# Patient Record
Sex: FEMALE | Race: WHITE | URBAN - METROPOLITAN AREA
[De-identification: names, ages, dates, MRNs, and addresses within clinical notes are randomized per-mention and may not be internally consistent; named-entity substitution may affect disease eponyms.]

---

## 2017-10-24 ENCOUNTER — OFFICE VISIT (OUTPATIENT)
Dept: BEHAVIORAL/MENTAL HEALTH CLINIC | Age: 22
End: 2017-10-24

## 2017-10-24 VITALS — SYSTOLIC BLOOD PRESSURE: 114 MMHG | WEIGHT: 112 LBS | HEART RATE: 85 BPM | DIASTOLIC BLOOD PRESSURE: 79 MMHG

## 2017-10-24 DIAGNOSIS — F33.1 MODERATE EPISODE OF RECURRENT MAJOR DEPRESSIVE DISORDER (HCC): Primary | ICD-10-CM

## 2017-10-24 DIAGNOSIS — Z86.59 HISTORY OF ADHD: ICD-10-CM

## 2017-10-24 RX ORDER — ALBUTEROL SULFATE 90 UG/1
AEROSOL, METERED RESPIRATORY (INHALATION)
Refills: 0 | COMMUNITY
Start: 2017-08-31

## 2017-10-24 RX ORDER — FLUOXETINE 10 MG/1
10 CAPSULE ORAL DAILY
Qty: 30 CAP | Refills: 0 | Status: SHIPPED | OUTPATIENT
Start: 2017-10-24 | End: 2017-11-21 | Stop reason: DRUGHIGH

## 2017-10-24 RX ORDER — NORETHINDRONE ACETATE AND ETHINYL ESTRADIOL AND FERROUS FUMARATE 1.5-30(21)
KIT ORAL
Refills: 8 | COMMUNITY
Start: 2017-09-30 | End: 2022-03-23

## 2017-10-24 RX ORDER — DEXAMETHASONE 4 MG/1
TABLET ORAL
Refills: 2 | COMMUNITY
Start: 2017-09-15 | End: 2022-03-23

## 2017-10-24 RX ORDER — FLUOXETINE 10 MG/1
10 CAPSULE ORAL DAILY
Qty: 30 CAP | Refills: 0 | Status: SHIPPED | OUTPATIENT
Start: 2017-10-24 | End: 2017-10-24 | Stop reason: SDUPTHER

## 2017-10-24 RX ORDER — OMEPRAZOLE 20 MG/1
CAPSULE, DELAYED RELEASE ORAL
Refills: 1 | COMMUNITY
Start: 2017-09-23 | End: 2022-03-23

## 2017-10-24 NOTE — MR AVS SNAPSHOT
Visit Information Date & Time Provider Department Dept. Phone Encounter #  
 10/24/2017  2:00 PM Anna Muller MD [facility] [id number] Follow-up Instructions Return in about 1 month (around 11/24/2017). Allergies as of 10/24/2017  Review Complete On: 10/24/2017 By: Anna Muller MD  
  
 Severity Noted Reaction Type Reactions Beef Containing Products  10/24/2017    Itching Milk  10/24/2017    Other (comments) Pork Derived (Porcine)  10/24/2017    Itching Current Immunizations  Never Reviewed No immunizations on file. Not reviewed this visit You Were Diagnosed With   
  
 Codes Comments Moderate episode of recurrent [facility] depressive disorder ([facility]    -  Primary ICD-10-CM: F33.1 ICD-9-CM: 296.32 History of ADHD     ICD-10-CM: Z86.59 
ICD-9-CM: V11.8 Vitals BP Pulse Weight(growth percentile) LMP Smoking Status 114/79 85 112 lb (50.8 kg) 09/24/2017 Current Some Day Smoker Vitals History Preferred Pharmacy Pharmacy Name Phone [facility] [address] [place] [address] [phone] Your Updated Medication List  
  
   
This list is accurate as of: 10/24/17  2:32 PM.  Always use your most recent med list.  
  
  
  
  
 FLOVENT  mcg/actuation inhaler Generic drug:  fluticasone INHALE 2 INHALATIONS PO BID FLUoxetine 10 mg capsule Commonly known as:  PROzac Take 1 Cap by mouth daily. Indications: major depressive disorder MICROGESTIN FE 1.5/30 (28) 1.5 mg-30 mcg (21)/75 mg (7) Tab Generic drug:  norethindrone-ethinyl estradiol-iron TK 1 T PO D  
  
 omeprazole 20 mg capsule Commonly known as:  PRILOSEC TK 1 C PO QAM BEFORE BREAKFAST PROAIR HFA 90 mcg/actuation inhaler Generic drug:  albuterol INHALE 2 PUFFS PO Q 4 TO 6 H PRN BREATHING  
  
  
  
  
Prescriptions Sent to Pharmacy Refills FLUoxetine (PROZAC) 10 mg capsule 0 Sig: Take 1 Cap by mouth daily. Indications: major depressive disorder Class: Normal  
 Pharmacy: [facility], [address] [place] [address] [address] #: [phone] Route: Oral  
  
Follow-up Instructions Return in about 1 month (around 11/24/2017). Introducing [facility] SERVICES! [facility] introduces Labcyte patient portal. Now you can access parts of your medical record, email your doctor's office, and request medication refills online. 1. In your internet browser, go to [email or web address] 2. Click on the First Time User? Click Here link in the Sign In box. You will see the New Member Sign Up page. 3. Enter your Labcyte Access Code exactly as it appears below. You will not need to use this code after youve completed the sign-up process. If you do not sign up before the expiration date, you must request a new code. · Labcyte Access Code: [email or web address] Expires: 1/22/2018  2:21 PM 
 
4. Enter the last four digits of your Social Security Number (xxxx) and Date of Birth (mm/dd/yyyy) as indicated and click Submit. You will be taken to the next sign-up page. 5. Create a Labcyte ID. This will be your Labcyte login ID and cannot be changed, so think of one that is secure and easy to remember. 6. Create a Labcyte password. You can change your password at any time. 7. Enter your Password Reset Question and Answer. This can be used at a later time if you forget your password. 8. Enter your e-mail address. You will receive e-mail notification when new information is available in [address] 9. Click Sign Up. You can now view and download portions of your medical record. 10. Click the Download Summary menu link to download a portable copy of your medical information.  
 
If you have questions, please visit the Frequently Asked Questions section of the ProtonMail. Remember, Classtingt is NOT to be used for urgent needs. For medical emergencies, dial 911. Now available from your iPhone and Android! Please provide this summary of care documentation to your next provider. If you have any questions after today's visit, please call [phone].
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
## 2017-11-21 ENCOUNTER — OFFICE VISIT (OUTPATIENT)
Dept: BEHAVIORAL/MENTAL HEALTH CLINIC | Age: 22
End: 2017-11-21

## 2017-11-21 VITALS
HEIGHT: 59 IN | WEIGHT: 108 LBS | SYSTOLIC BLOOD PRESSURE: 122 MMHG | BODY MASS INDEX: 21.77 KG/M2 | HEART RATE: 98 BPM | DIASTOLIC BLOOD PRESSURE: 84 MMHG

## 2017-11-21 DIAGNOSIS — F33.1 MODERATE EPISODE OF RECURRENT MAJOR DEPRESSIVE DISORDER (HCC): Primary | ICD-10-CM

## 2017-11-21 DIAGNOSIS — Z86.59 HISTORY OF ADHD: ICD-10-CM

## 2017-11-21 RX ORDER — FLUOXETINE HYDROCHLORIDE 20 MG/1
20 CAPSULE ORAL DAILY
Qty: 30 CAP | Refills: 0 | Status: SHIPPED | OUTPATIENT
Start: 2017-11-21 | End: 2017-12-20 | Stop reason: SDUPTHER

## 2017-11-21 NOTE — MR AVS SNAPSHOT
Visit Information Date & Time Provider Department Dept. Phone Encounter #  
 11/21/2017  1:30 PM Zaria Agosto MD [facility] [id number] Follow-up Instructions Return in about 1 month (around 12/21/2017). Upcoming Health Maintenance Date Due  
 HPV AGE 9Y-34Y (1 of 3 - Female 3 Dose Series) 8/16/2007 Pneumococcal 19-64 Medium Risk (1 of 1 - PPSV23) 8/16/2015 Influenza Age 5 to Adult 8/1/2017 DTaP/Tdap/Td series (1 - Tdap) 8/16/2017 PAP AKA CERVICAL CYTOLOGY 8/16/2017 Allergies as of 11/21/2017  Review Complete On: 11/21/2017 By: Zaria Agosto MD  
  
 Severity Noted Reaction Type Reactions Beef Containing Products  10/24/2017    Itching Milk  10/24/2017    Other (comments) Pork Derived (Porcine)  10/24/2017    Itching Current Immunizations  Never Reviewed No immunizations on file. Not reviewed this visit You Were Diagnosed With   
  
 Codes Comments Moderate episode of recurrent major depressive disorder ([facility]    -  Primary ICD-10-CM: F33.1 ICD-9-CM: 296.32 History of ADHD     ICD-10-CM: Z86.59 
ICD-9-CM: V11.8 Vitals BP Pulse Height(growth percentile) Weight(growth percentile) LMP BMI  
 122/84 98 4' 11\" (1.499 m) 108 lb (49 kg) 10/24/2017 21.81 kg/m2 Smoking Status Current Some Day Smoker BMI and BSA Data Body Mass Index Body Surface Area  
 21.81 kg/m 2 1.43 m 2 Preferred Pharmacy Pharmacy Name Phone [facility] [address] [facility] [phone] Your Updated Medication List  
  
   
This list is accurate as of: 11/21/17  1:49 PM.  Always use your most recent med list.  
  
  
  
  
 FLOVENT  mcg/actuation inhaler Generic drug:  fluticasone INHALE 2 INHALATIONS PO BID FLUoxetine 20 mg capsule Commonly known as:  PROzac  
 Take 1 Cap by mouth daily. Indications: major depressive disorder MICROGESTIN FE 1.5/30 (28) 1.5 mg-30 mcg (21)/75 mg (7) Tab Generic drug:  norethindrone-ethinyl estradiol-iron TK 1 T PO D  
  
 omeprazole 20 mg capsule Commonly known as:  PRILOSEC TK 1 C PO QAM BEFORE BREAKFAST PROAIR HFA 90 mcg/actuation inhaler Generic drug:  albuterol INHALE 2 PUFFS PO Q 4 TO 6 H PRN BREATHING  
  
  
  
  
Prescriptions Sent to Pharmacy Refills FLUoxetine (PROZAC) 20 mg capsule 0 Sig: Take 1 Cap by mouth daily. Indications: major depressive disorder Class: Normal  
 Pharmacy: [facility] [place] [address] [place] AT [address] #: [phone] Route: Oral  
  
Follow-up Instructions Return in about 1 month (around 12/21/2017). Introducing [facility] & HEALTH SERVICES! Adelina Joyce introduces [employer] patient portal. Now you can access parts of your medical record, email your doctor's office, and request medication refills online. 1. In your internet browser, go to [email or web address] 2. Click on the First Time User? Click Here link in the Sign In box. You will see the New Member Sign Up page. 3. Enter your [employer] Access Code exactly as it appears below. You will not need to use this code after youve completed the sign-up process. If you do not sign up before the expiration date, you must request a new code. · [employer] Access Code: [email or web address] Expires: 1/22/2018  1:21 PM 
 
4. Enter the last four digits of your Social Security Number (xxxx) and Date of Birth (mm/dd/yyyy) as indicated and click Submit. You will be taken to the next sign-up page. 5. Create a imeemt ID. This will be your Pact Fitness login ID and cannot be changed, so think of one that is secure and easy to remember. 6. Create a imeemt password. You can change your password at any time. 7. Enter your Password Reset Question and Answer. This can be used at a later time if you forget your password. 8. Enter your e-mail address. You will receive e-mail notification when new information is available in [address] 9. Click Sign Up. You can now view and download portions of your medical record. 10. Click the Download Summary menu link to download a portable copy of your medical information. If you have questions, please visit the Frequently Asked Questions section of the Cashsquare website. Remember, Cashsquare is NOT to be used for urgent needs. For medical emergencies, dial 911. Now available from your iPhone and Android! Please provide this summary of care documentation to your next provider. If you have any questions after today's visit, please call [phone].

## 2017-11-24 NOTE — PROGRESS NOTES
Psychiatric Progress Note    Date: 11/21/2017  Account Number:  [de-identified]  Name: Aldair Marinelli    Length of psychotherapy session: 15 minutes     Total Patient Care Time Spent: 20 minutes : (Coordinated care:  counseling time with patient, individual psychotherapy with patient; discussions with family members and chart review). SUBJECTIVE:   Aldair Marinelli  is a 25 y.o.  female  patient presents for a therapy/psychopharmacological management appointment. Pt reports doing \"okay, I guess\". States she has noticed some difference in herself, now getting out of bed more, goes to work but when returns home goes back to bed, still feels hopeless and helpless. Patient denies SI/HI/SIB. No evidence of AH/VH or delusions.       Appetite:no change from normal   Sleep: no change     Response to Treatment: minimal improvement  Side Effects: none      Supportive/Cognitive/Reality-Oriented psychotherapy provided in regards to psychosocial stressors:   pre-admission and current problems   Housing issues   Occupational issues   Academic issues   Legal issues   Medical issues   Interpersonal conflicts   Stress of hospitalization  Psychoeducation provided  Treatment plan reviewed with patient-including diagnosis and medications  Worked on issues of denial & effects of substance dependency/use      OBJECTIVE:                 Mental Status exam: WNL except for      Sensorium  oriented to time, place and person   Relations cooperative    Eye Contact    appropriate   Appearance:  age appropriate, casually dressed and groomed, petite    Motor Behavior:  within normal limits   Speech:  normal pitch and normal volume   Thought Process: organized   Thought Content free of delusions and free of hallucinations   Suicidal ideations none   Homicidal ideations none   Mood:  depressed   Affect:  constricted   Memory recent  adequate   Memory remote:  adequate   Concentration:  adequate   Abstraction:  abstract   Insight:  fair Reliability fair   Judgment:  fair       Allergies   Allergen Reactions    Beef Containing Products Itching    Milk Other (comments)    Pork Derived (Porcine) Itching        Current Outpatient Prescriptions   Medication Sig Dispense Refill    FLUoxetine (PROZAC) 20 mg capsule Take 1 Cap by mouth daily. Indications: major depressive disorder 30 Cap 0    MICROGESTIN FE 1.5/30, 28, 1.5 mg-30 mcg (21)/75 mg (7) tab TK 1 T PO D  8    omeprazole (PRILOSEC) 20 mg capsule TK 1 C PO QAM BEFORE BREAKFAST  1    PROAIR HFA 90 mcg/actuation inhaler INHALE 2 PUFFS PO Q 4 TO 6 H PRN BREATHING  0    FLOVENT  mcg/actuation inhaler INHALE 2 INHALATIONS PO BID  2        Medication Changes/Adjustments:   Medications Discontinued During This Encounter   Medication Reason    FLUoxetine (PROZAC) 10 mg capsule Dose Adjustment          ASSESSMENT:  Apolinar Schmidt  is a 25 y.o.  female patient presented for her f/u appointment. Able to tolerate the medicine, reports minimal improvement, will continue to titrate up the dose. Diagnoses:   Axis I:   Major Depressive Disorder              ADHD by history   Axis II:  Deferred  Axis III: As noted above  Axis IV: Moderate   Axis V:  59-65    RECOMMENDATIONS/PLAN:   1. Medications: Medications reviewed, will increase Prozac to 20mg daily. Orders Placed This Encounter    FLUoxetine (PROZAC) 20 mg capsule      2. Follow-up Disposition:  Return in about 1 month (around 12/21/2017).

## 2017-12-20 ENCOUNTER — OFFICE VISIT (OUTPATIENT)
Dept: BEHAVIORAL/MENTAL HEALTH CLINIC | Age: 22
End: 2017-12-20

## 2017-12-20 DIAGNOSIS — F33.1 MODERATE EPISODE OF RECURRENT MAJOR DEPRESSIVE DISORDER (HCC): Primary | ICD-10-CM

## 2017-12-20 DIAGNOSIS — Z86.59 HISTORY OF ADHD: ICD-10-CM

## 2017-12-20 RX ORDER — FLUOXETINE HYDROCHLORIDE 20 MG/1
20 CAPSULE ORAL DAILY
Qty: 30 CAP | Refills: 2 | Status: SHIPPED | OUTPATIENT
Start: 2017-12-20 | End: 2018-02-19 | Stop reason: SDUPTHER

## 2017-12-20 NOTE — MR AVS SNAPSHOT
Visit Information Date & Time Provider Department Dept. Phone Encounter #  
 12/20/2017  1:30 PM Judy Coffman  [facility] [phone] [id number] Follow-up Instructions Return in about 2 months (around 2/20/2018). Upcoming Health Maintenance Date Due  
 HPV AGE 9Y-34Y (1 of 3 - Female 3 Dose Series) 8/16/2007 Pneumococcal 19-64 Medium Risk (1 of 1 - PPSV23) 8/16/2015 Influenza Age 5 to Adult 8/1/2017 DTaP/Tdap/Td series (1 - Tdap) 8/16/2017 PAP AKA CERVICAL CYTOLOGY 8/16/2017 Allergies as of 12/20/2017  Review Complete On: 12/20/2017 By: Judy Coffman MD  
  
 Severity Noted Reaction Type Reactions Beef Containing Products  10/24/2017    Itching Milk  10/24/2017    Other (comments) Pork Derived (Porcine)  10/24/2017    Itching Current Immunizations  Never Reviewed No immunizations on file. Not reviewed this visit You Were Diagnosed With   
  
 Codes Comments Moderate episode of recurrent major depressive disorder [facility]    -  Primary ICD-10-CM: F33.1 ICD-9-CM: 296.32 History of ADHD     ICD-10-CM: Z86.59 
ICD-9-CM: V11.8 Vitals LMP Smoking Status 10/24/2017 Current Some Day Smoker Preferred Pharmacy [facility]acy Name Phone [facility] [address] [facility] [phone] Your Updated Medication List  
  
   
This list is accurate as of: 12/20/17  1:49 PM.  Always use your most recent med list.  
  
  
  
  
 FLOVENT  mcg/actuation inhaler Generic drug:  fluticasone INHALE 2 INHALATIONS PO BID FLUoxetine 20 mg capsule Commonly known as:  PROzac Take 1 Cap by mouth daily. Indications: major depressive disorder MICROGESTIN FE 1.5/30 (28) 1.5 mg-30 mcg (21)/75 mg (7) Tab Generic drug:  norethindrone-ethinyl estradiol-iron TK 1 T PO D  
  
 omeprazole 20 mg capsule Commonly known as:  PRILOSEC TK 1 C PO QAM BEFORE BREAKFAST PROAIR HFA 90 mcg/actuation inhaler Generic drug:  albuterol INHALE 2 PUFFS PO Q 4 TO 6 H PRN BREATHING  
  
  
  
  
Prescriptions Sent to Pharmacy Refills FLUoxetine (PROZAC) 20 mg capsule 2 Sig: Take 1 Cap by mouth daily. Indications: major depressive disorder Class: Normal  
 Pharmacy: [facility], [address] [place] [address] #: [phone] Route: Oral  
  
Follow-up Instructions Return in about 2 months (around 2/20/2018). Introducing [facility] & HEALTH SERVICES! Milton Norris introduces Proformative patient portal. Now you can access parts of your medical record, email your doctor's office, and request medication refills online. 1. In your internet browser, go to [email or web address] 2. Click on the First Time User? Click Here link in the Sign In box. You will see the New Member Sign Up page. 3. Enter your Proformative Access Code exactly as it appears below. You will not need to use this code after youve completed the sign-up process. If you do not sign up before the expiration date, you must request a new code. · Proformative Access Code: [email or web address] Expires: 1/22/2018  1:21 PM 
 
4. Enter the last four digits of your Social Security Number (xxxx) and Date of Birth (mm/dd/yyyy) as indicated and click Submit. You will be taken to the next sign-up page. 5. Create a Instabankt ID. This will be your Proformative login ID and cannot be changed, so think of one that is secure and easy to remember. 6. Create a Proformative password. You can change your password at any time. 7. Enter your Password Reset Question and Answer. This can be used at a later time if you forget your password. 8. Enter your e-mail address. You will receive e-mail notification when new information is available in [address] 9. Click Sign Up. You can now view and download portions of your medical record. 10. Click the Download Summary menu link to download a portable copy of your medical information. If you have questions, please visit the Frequently Asked Questions section of the Ultralife website. Remember, Ultralife is NOT to be used for urgent needs. For medical emergencies, dial 911. Now available from your iPhone and Android! Please provide this summary of care documentation to your next provider. If you have any questions after today's visit, please call [phone].

## 2017-12-23 NOTE — PROGRESS NOTES
Psychiatric Progress Note    Date: 12/20/2017  Account Number:  [de-identified]  Name: Felton Richardson    Length of psychotherapy session: 15 minutes     Total Patient Care Time Spent: 20 minutes : (Coordinated care:  counseling time with patient, individual psychotherapy with patient; discussions with family members and chart review). SUBJECTIVE:   Felton Richardson  is a 25 y.o.  female  patient presents for a therapy/psychopharmacological management appointment. Pt reports doing good, states she is feeling lot better now, mood is improved so is anxiety. Taking meds as prescribe. Patient denies SI/HI/SIB. No evidence of AH/VH or delusions.       Appetite:no change from normal   Sleep: no change     Response to Treatment: positive   Side Effects: none      Supportive/Cognitive/Reality-Oriented psychotherapy provided in regards to psychosocial stressors:   pre-admission and current problems   Housing issues   Occupational issues   Academic issues   Legal issues   Medical issues   Interpersonal conflicts   Stress of hospitalization  Psychoeducation provided  Treatment plan reviewed with patient-including diagnosis and medications  Worked on issues of denial & effects of substance dependency/use      OBJECTIVE:                 Mental Status exam: WNL except for      Sensorium  oriented to time, place and person   Relations cooperative    Eye Contact    appropriate   Appearance:  age appropriate, casually dressed and groomed, petite    Motor Behavior:  within normal limits   Speech:  normal pitch and normal volume   Thought Process: organized   Thought Content free of delusions and free of hallucinations   Suicidal ideations none   Homicidal ideations none   Mood:  Improved    Affect:  Constricted/animated    Memory recent  adequate   Memory remote:  adequate   Concentration:  adequate   Abstraction:  abstract   Insight:  Fair/good   Reliability fair/good   Judgment:  Fair/good       Allergies   Allergen Reactions    Beef Containing Products Itching    Milk Other (comments)    Pork Derived (Porcine) Itching        Current Outpatient Prescriptions   Medication Sig Dispense Refill    FLUoxetine (PROZAC) 20 mg capsule Take 1 Cap by mouth daily. Indications: major depressive disorder 30 Cap 2    MICROGESTIN FE 1.5/30, 28, 1.5 mg-30 mcg (21)/75 mg (7) tab TK 1 T PO D  8    omeprazole (PRILOSEC) 20 mg capsule TK 1 C PO QAM BEFORE BREAKFAST  1    PROAIR HFA 90 mcg/actuation inhaler INHALE 2 PUFFS PO Q 4 TO 6 H PRN BREATHING  0    FLOVENT  mcg/actuation inhaler INHALE 2 INHALATIONS PO BID  2        Medication Changes/Adjustments:   Medications Discontinued During This Encounter   Medication Reason    FLUoxetine (PROZAC) 20 mg capsule Reorder          ASSESSMENT:  Vikki Vazquez  is a 25 y.o.  female patient presented for her f/u appointment. Pt is responding well to the medicine, mood is improved. Diagnoses:   Axis I:   Major Depressive Disorder              ADHD by history   Axis II:  Deferred  Axis III: As noted above  Axis IV: Moderate   Axis V:  61-69    RECOMMENDATIONS/PLAN:   1. Medications: Medications reviewed, continue with Prozac 20mg daily. Orders Placed This Encounter    FLUoxetine (PROZAC) 20 mg capsule      2. Follow-up Disposition:  Return in about 2 months (around 2/20/2018).

## 2018-02-19 ENCOUNTER — OFFICE VISIT (OUTPATIENT)
Dept: BEHAVIORAL/MENTAL HEALTH CLINIC | Age: 23
End: 2018-02-19

## 2018-02-19 VITALS
RESPIRATION RATE: 14 BRPM | OXYGEN SATURATION: 98 % | DIASTOLIC BLOOD PRESSURE: 66 MMHG | SYSTOLIC BLOOD PRESSURE: 91 MMHG | HEART RATE: 92 BPM | HEIGHT: 59 IN

## 2018-02-19 DIAGNOSIS — Z86.59 HISTORY OF ADHD: ICD-10-CM

## 2018-02-19 DIAGNOSIS — F33.1 MODERATE EPISODE OF RECURRENT MAJOR DEPRESSIVE DISORDER (HCC): Primary | ICD-10-CM

## 2018-02-19 RX ORDER — FLUOXETINE HYDROCHLORIDE 20 MG/1
20 CAPSULE ORAL DAILY
Qty: 90 CAP | Refills: 1 | Status: SHIPPED | OUTPATIENT
Start: 2018-02-19 | End: 2022-04-14

## 2018-02-19 NOTE — PROGRESS NOTES
Id    Chief Complaint   Patient presents with    Medication Management       1. Have you been to the ER, urgent care clinic since your last visit? Hospitalized since your last visit? No    2. Have you seen or consulted any other health care providers outside of the [address] [place] [address] since your last visit? Include any pap smears or colon screening.   No    Visit Vitals    BP 91/66 (BP 1 Location: Left arm, BP Patient Position: Sitting)    Pulse 92    Resp 14    Ht 4' 11\" (1.499 m)    SpO2 98%

## 2018-02-19 NOTE — MR AVS SNAPSHOT
[address] [facility] [address] [place] [address] 
[phone] Patient: Raymond Sarmiento MRN: [id number] Conchita Hernan Visit Information Date & Time Provider Department Dept. Phone Encounter #  
 2/19/2018  1:45 PM Ld Pantoja [place] [id number] formerly [facility] [phone] [id number] Upcoming Health Maintenance Date Due  
 HPV AGE 9Y-34Y (1 of 3 - Female 3 Dose Series) 8/16/2007 Pneumococcal 19-64 Medium Risk (1 of 1 - PPSV23) 8/16/2015 Influenza Age 5 to Adult 8/1/2017 DTaP/Tdap/Td series (1 - Tdap) 8/16/2017 PAP AKA CERVICAL CYTOLOGY 8/16/2017 Allergies as of 2/19/2018  Review Complete On: 2/19/2018 By: Ld Pantoja MD  
  
 Severity Noted Reaction Type Reactions Beef Containing Products  10/24/2017    Itching Milk  10/24/2017    Other (comments) Pork Derived (Porcine)  10/24/2017    Itching Current Immunizations  Never Reviewed No immunizations on file. Not reviewed this visit You Were Diagnosed With   
  
 Codes Comments Moderate episode of recurrent major depressive disorder ([facility]    -  Primary ICD-10-CM: F33.1 ICD-9-CM: 296.32 History of ADHD     ICD-10-CM: Z86.59 
ICD-9-CM: V11.8 Vitals BP Pulse Resp Height(growth percentile) LMP SpO2  
 91/66 (BP 1 Location: Left arm, BP Patient Position: Sitting) 92 14 4' 11\" (1.499 m) 01/31/2018 (Approximate) 98% OB Status Smoking Status Having regular periods Current Some Day Smoker Vitals History Preferred Pharmacy Pharmacy Name Phone [facility] [address] [facility] [phone] Your Updated Medication List  
  
   
This list is accurate as of: 2/19/18  2:09 PM.  Always use your most recent med list.  
  
  
  
  
 FLOVENT  mcg/actuation inhaler Generic drug:  fluticasone INHALE 2 INHALATIONS PO BID  
  
 FLUoxetine 20 mg capsule Commonly known as:  PROzac Take 1 Cap by mouth daily. Indications: major depressive disorder MICROGESTIN FE 1.5/30 (28) 1.5 mg-30 mcg (21)/75 mg (7) Tab Generic drug:  norethindrone-ethinyl estradiol-iron TK 1 T PO D  
  
 omeprazole 20 mg capsule Commonly known as:  PRILOSEC TK 1 C PO QAM BEFORE BREAKFAST PROAIR HFA 90 mcg/actuation inhaler Generic drug:  albuterol INHALE 2 PUFFS PO Q 4 TO 6 H PRN BREATHING  
  
  
  
  
Prescriptions Sent to Pharmacy Refills FLUoxetine (PROZAC) 20 mg capsule 1 Sig: Take 1 Cap by mouth daily. Indications: major depressive disorder Class: Normal  
 Pharm[facility]: [facility] [place], [facility] [address] [place] [address] #: [phone] Route: Oral  
  
Introducing [facility]! Cassidy Iyer introduces [employer] patient portal. Now you can access parts of your medical record, email your doctor's office, and request medication refills online. 1. In your internet browser, go to [email or web address] 2. Click on the First Time User? Click Here link in the Sign In box. You will see the New Member Sign Up page. 3. Enter your [employer] Access Code exactly as it appears below. You will not need to use this code after youve completed the sign-up process. If you do not sign up before the expiration date, you must request a new code. · [employer] Access Code: [email or web address] Expires: 5/20/2018  2:07 PM 
 
4. Enter the last four digits of your Social Security Number (xxxx) and Date of Birth (mm/dd/yyyy) as indicated and click Submit. You will be taken to the next sign-up page. 5. Create a Sophia Searcht ID. This will be your [employer] login ID and cannot be changed, so think of one that is secure and easy to remember. 6. Create a [employer] password. You can change your password at any time. 7. Enter your Password Reset Question and Answer.  This can be used at a later time if you forget your password. 8. Enter your e-mail address. You will receive e-mail notification when new information is available in [address] 9. Click Sign Up. You can now view and download portions of your medical record. 10. Click the Download Summary menu link to download a portable copy of your medical information. If you have questions, please visit the Frequently Asked Questions section of the [employer] website. Remember, [employer] is NOT to be used for urgent needs. For medical emergencies, dial 911. Now available from your iPhone and Android! Please provide this summary of care documentation to your next provider. If you have any questions after today's visit, please call [phone].

## 2018-02-20 NOTE — PROGRESS NOTES
Psychiatric Progress Note    Date: 2/19/2018  Account Number:  [de-identified]  Name: Renae Martínez    Length of psychotherapy session: 15 minutes     Total Patient Care Time Spent: 20 minutes : (Coordinated care:  counseling time with patient, individual psychotherapy with patient; discussions with family members and chart review). SUBJECTIVE:   Renae Martínez  is a 25 y.o.  female  patient presents for a therapy/psychopharmacological management appointment. Pt reports doing \"pretty good\", states her mood and anxiety have improved, she is going out of house more, work is going well, she is taking meds as prescribed. Patient denies SI/HI/SIB. No evidence of AH/VH or delusions.       Appetite:no change from normal   Sleep: no change     Response to Treatment: positive   Side Effects: none      Supportive/Cognitive/Reality-Oriented psychotherapy provided in regards to psychosocial stressors:   pre-admission and current problems   Housing issues   Occupational issues   Academic issues   Legal issues   Medical issues   Interpersonal conflicts   Stress of hospitalization  Psychoeducation provided  Treatment plan reviewed with patient-including diagnosis and medications  Worked on issues of denial & effects of substance dependency/use      OBJECTIVE:                 Mental Status exam: WNL except for      Sensorium  oriented to time, place and person   Relations cooperative    Eye Contact    appropriate   Appearance:  age appropriate, casually dressed and groomed, petite    Motor Behavior:  within normal limits   Speech:  normal pitch and normal volume   Thought Process: organized   Thought Content free of delusions and free of hallucinations   Suicidal ideations none   Homicidal ideations none   Mood:  Stable    Affect:  Mood congruent    Memory recent  adequate   Memory remote:  adequate   Concentration:  adequate   Abstraction:  abstract   Insight:  good   Reliability good   Judgment:  good       Allergies   Allergen Reactions    Beef Containing Products Itching    Milk Other (comments)    Pork Derived (Porcine) Itching        Current Outpatient Prescriptions   Medication Sig Dispense Refill    FLUoxetine (PROZAC) 20 mg capsule Take 1 Cap by mouth daily. Indications: major depressive disorder 90 Cap 1    MICROGESTIN FE 1.5/30, 28, 1.5 mg-30 mcg (21)/75 mg (7) tab TK 1 T PO D  8    omeprazole (PRILOSEC) 20 mg capsule TK 1 C PO QAM BEFORE BREAKFAST  1    PROAIR HFA 90 mcg/actuation inhaler INHALE 2 PUFFS PO Q 4 TO 6 H PRN BREATHING  0    FLOVENT  mcg/actuation inhaler INHALE 2 INHALATIONS PO BID  2        Medication Changes/Adjustments:   Medications Discontinued During This Encounter   Medication Reason    FLUoxetine (PROZAC) 20 mg capsule Reorder          ASSESSMENT:  Maryann Alanis  is a 25 y.o.  female patient presented for her f/u appointment. Pt is stable on her current medicine. Diagnoses: Moderate Major Depressive Disorder  ADHD by history       RECOMMENDATIONS/PLAN:   1. Medications: Medications reviewed, continue with Prozac 20mg daily. Orders Placed This Encounter    FLUoxetine (PROZAC) 20 mg capsule      2.   Follow-up Disposition: Not on File

## 2018-10-28 ENCOUNTER — IP HISTORICAL/CONVERTED ENCOUNTER (OUTPATIENT)
Dept: OTHER | Age: 23
End: 2018-10-28

## 2022-03-23 ENCOUNTER — OFFICE VISIT (OUTPATIENT)
Dept: BEHAVIORAL/MENTAL HEALTH CLINIC | Age: 27
End: 2022-03-23
Payer: COMMERCIAL

## 2022-03-23 VITALS
SYSTOLIC BLOOD PRESSURE: 119 MMHG | RESPIRATION RATE: 16 BRPM | HEART RATE: 89 BPM | DIASTOLIC BLOOD PRESSURE: 78 MMHG | TEMPERATURE: 97.9 F | HEIGHT: 59 IN | OXYGEN SATURATION: 98 % | BODY MASS INDEX: 27.5 KG/M2 | WEIGHT: 136.4 LBS

## 2022-03-23 DIAGNOSIS — F33.1 MDD (MAJOR DEPRESSIVE DISORDER), RECURRENT EPISODE, MODERATE (HCC): Primary | ICD-10-CM

## 2022-03-23 DIAGNOSIS — F90.2 ADHD (ATTENTION DEFICIT HYPERACTIVITY DISORDER), COMBINED TYPE: ICD-10-CM

## 2022-03-23 PROCEDURE — 99204 OFFICE O/P NEW MOD 45 MIN: CPT | Performed by: NURSE PRACTITIONER

## 2022-03-23 RX ORDER — VENLAFAXINE HYDROCHLORIDE 37.5 MG/1
37.5 CAPSULE, EXTENDED RELEASE ORAL DAILY
Qty: 30 CAPSULE | Refills: 0 | Status: SHIPPED | OUTPATIENT
Start: 2022-03-23 | End: 2022-04-14 | Stop reason: SDUPTHER

## 2022-03-23 NOTE — PROGRESS NOTES
Identified pt with two pt identifiers(name and ). Reviewed record in preparation for visit and have obtained necessary documentation. Chief Complaint   Patient presents with    New Patient      Vitals:    22 1302   BP: 119/78   Pulse: 89   Resp: 16   Temp: 97.9 °F (36.6 °C)   TempSrc: Temporal   SpO2: 98%   Weight: 61.9 kg (136 lb 6.4 oz)   Height: 4' 11\" (1.499 m)   PainSc:   0 - No pain       Health Maintenance Review: Patient reminded of \"due or due soon\" health maintenance. I have asked the patient to contact his/her primary care provider (PCP) for follow-up on his/her health maintenance. Coordination of Care Questionnaire:  :   1) Have you been to an emergency room, urgent care, or hospitalized since your last visit? If yes, where when, and reason for visit? no       2. Have seen or consulted any other health care provider since your last visit? If yes, where when, and reason for visit? NO      Patient is accompanied by self I have received verbal consent from Marquise Harvey to discuss any/all medical information while they are present in the room.

## 2022-03-23 NOTE — PATIENT INSTRUCTIONS
Recovering From Depression: Care Instructions  Your Care Instructions     Taking good care of yourself is important as you recover from depression. In time, your symptoms will fade as your treatment takes hold. Do not give up. Instead, focus your energy on getting better. Your mood will improve. It just takes some time. Focus on things that can help you feel better, such as being with friends and family, eating well, and getting enough rest. But take things slowly. Do not do too much too soon. You will begin to feel better gradually. Follow-up care is a key part of your treatment and safety. Be sure to make and go to all appointments, and call your doctor if you are having problems. It's also a good idea to know your test results and keep a list of the medicines you take. How can you care for yourself at home? Be realistic  · If you have a large task to do, break it up into smaller steps you can handle, and just do what you can. · You may want to put off important decisions until your depression has lifted. If you have plans that will have a major impact on your life, such as marriage, divorce, or a job change, try to wait a bit. Talk it over with friends and loved ones who can help you look at the overall picture first.  · Reaching out to people for help is important. Do not isolate yourself. Let your family and friends help you. Find someone you can trust and confide in, and talk to that person. · Be patient, and be kind to yourself. Remember that depression is not your fault and is not something you can overcome with willpower alone. Treatment is important for depression, just like for any other illness. Feeling better takes time, and your mood will improve little by little. Stay active  · Stay busy and get outside. Take a walk, or try some other light exercise. · Talk with your doctor about an exercise program. Exercise can help with mild depression. · Go to a movie or concert.  Take part in a Mandaeism activity or other social gathering. Go to a [employer] game. · Ask a friend to have dinner with you. Take care of yourself  · Eat a balanced diet with plenty of fresh fruits and vegetables, whole grains, and lean protein. If you have lost your appetite, eat small snacks rather than large meals. · Avoid using illegal drugs or marijuana and drinking alcohol. Do not take medicines that have not been prescribed for you. They may interfere with medicines you may be taking for depression, or they may make your depression worse. · Take your medicines exactly as they are prescribed. You may start to feel better within 1 to 3 weeks of taking antidepressant medicine. But it can take as many as 6 to 8 weeks to see more improvement. If you have questions or concerns about your medicines, or if you do not notice any improvement by 3 weeks, talk to your doctor. · Continue to take your medicine after your symptoms improve. Taking your medicine for at least 6 months after you feel better can help keep you from getting depressed again. If this isn't the first time you have been depressed, your doctor may recommend you to take medicine even longer. · If you have any side effects from your medicine, tell your doctor. Many side effects are mild and will go away on their own after you have been taking the medicine for a few weeks. Some may last longer. Talk to your doctor if side effects are bothering you too much. You might be able to try a different medicine. · Continue counseling. It may help prevent depression from returning, especially if you've had multiple episodes of depression. Talk with your counselor if you are having a hard time attending your sessions or you think the sessions aren't working. Don't just stop going. · Get enough sleep. Talk to your doctor if you are having problems sleeping. · Avoid sleeping pills unless they are prescribed by the doctor treating your depression.  Sleeping pills may make you groggy during the day, and they may interact with other medicine you are taking. · If you have any other illnesses, such as diabetes, heart disease, or high blood pressure, make sure to continue with your treatment. Tell your doctor about all of the medicines you take, including those with or without a prescription. · If you or someone you know talks about suicide, self-harm, or feeling hopeless, get help right away. Call the [address] [place] [address] at 1-800-273-talk (3-396.758.3852) or text HOME to 531902 to access the Crisis Text Line. Consider saving these numbers in your phone. When should you call for help? Call 911 anytime you think you may need emergency care. For example, call if:    · You feel like hurting yourself or someone else.     · Someone you know has depression and is about to attempt or is attempting suicide. Call your doctor now or seek immediate medical care if:    · You hear voices.     · Someone you know has depression and:  ? Starts to give away his or her possessions. ? Uses illegal drugs or drinks alcohol heavily. ? Talks or writes about death, including writing suicide notes or talking about guns, knives, or pills. ? Starts to spend a lot of time alone. ? Acts very aggressively or suddenly appears calm. Watch closely for changes in your health, and be sure to contact your doctor if:    · You do not get better as expected. Where can you learn more? Go to http://www.gray.com/  Enter N529 in the search box to learn more about \"Recovering From Depression: Care Instructions. \"  Current as of: June 16, 2021               Content Version: 13.2  © 1412-2741 [employer]. Care instructions adapted under license by [employer] (which disclaims liability or warranty for this information).  If you have questions about a medical condition or this instruction, always ask your healthcare professional. [address] any warranty or liability for your use of this information.
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
## 2022-04-14 ENCOUNTER — OFFICE VISIT (OUTPATIENT)
Dept: BEHAVIORAL/MENTAL HEALTH CLINIC | Age: 27
End: 2022-04-14
Payer: COMMERCIAL

## 2022-04-14 VITALS
OXYGEN SATURATION: 98 % | SYSTOLIC BLOOD PRESSURE: 115 MMHG | WEIGHT: 133.6 LBS | BODY MASS INDEX: 26.93 KG/M2 | TEMPERATURE: 97.7 F | HEART RATE: 94 BPM | DIASTOLIC BLOOD PRESSURE: 79 MMHG | RESPIRATION RATE: 16 BRPM | HEIGHT: 59 IN

## 2022-04-14 DIAGNOSIS — F90.2 ADHD (ATTENTION DEFICIT HYPERACTIVITY DISORDER), COMBINED TYPE: ICD-10-CM

## 2022-04-14 DIAGNOSIS — F33.1 MDD (MAJOR DEPRESSIVE DISORDER), RECURRENT EPISODE, MODERATE (HCC): Primary | ICD-10-CM

## 2022-04-14 PROCEDURE — 99214 OFFICE O/P EST MOD 30 MIN: CPT | Performed by: NURSE PRACTITIONER

## 2022-04-14 RX ORDER — VENLAFAXINE HYDROCHLORIDE 75 MG/1
75 CAPSULE, EXTENDED RELEASE ORAL DAILY
Qty: 30 CAPSULE | Refills: 0 | Status: SHIPPED | OUTPATIENT
Start: 2022-04-14 | End: 2022-05-19 | Stop reason: ALTCHOICE

## 2022-04-14 RX ORDER — VENLAFAXINE HYDROCHLORIDE 75 MG/1
75 CAPSULE, EXTENDED RELEASE ORAL DAILY
Qty: 30 CAPSULE | Refills: 0 | Status: SHIPPED | OUTPATIENT
Start: 2022-04-14 | End: 2022-04-14

## 2022-04-14 NOTE — PROGRESS NOTES
CHIEF COMPLAINT:  Mari Galloway is a 23 y.o. female and was seen today for follow-up of psychiatric condition and psychotropic medication management. HPI:    Iris Amparo reports the following psychiatric symptoms by hx:  depression, anxiety and impaired concentration . Overall symptoms have been present for years. Currently symptoms are of moderate severity. The symptoms occur years . Pt reports medications are effective. Met with pt for appt today  to review current treatment plan. FAMILY/SOCIAL HX:   Psychosocial Stressors       REVIEW OF SYSTEMS:  Psychiatric symptoms being monitored for:  depression, anxiety   Appetite:decreased   Sleep: improved   Neuro: denies   Cardio:denies     Visit Vitals  /79 (BP 1 Location: Right upper arm, BP Patient Position: Sitting, BP Cuff Size: Adult)   Pulse 94   Temp 97.7 °F (36.5 °C) (Temporal)   Resp 16   Ht 4' 11\" (1.499 m)   Wt 60.6 kg (133 lb 9.6 oz)   SpO2 98%   BMI 26.98 kg/m²       Side Effects:  GI disturbance, headache, somnolence    MENTAL STATUS EXAM:   Sensorium  oriented to time, place and person   Relations cooperative   Appearance:  age appropriate and within normal Limits   Motor Behavior:  within normal limits   Speech:  normal pitch and normal volume   Thought Process: within normal limits   Thought Content free of delusions and free of hallucinations   Suicidal ideations none   Homicidal ideations none   Mood:  \"Good\"   Affect:  normal   Memory recent  adequate   Memory remote:  adequate   Concentration:  impaired   Abstraction:  abstract   Insight:  good   Reliability good   Judgment:  good     MEDICAL DECISION MAKING:  Problems addressed today:    ICD-10-CM ICD-9-CM    1. MDD (major depressive disorder), recurrent episode, moderate ([facility])  F33.1 296.32 venlafaxine-SR (EFFEXOR-XR) 75 mg capsule      DISCONTINUED: venlafaxine-SR (EFFEXOR-XR) 75 mg capsule   2.  ADHD (attention deficit hyperactivity disorder), combined type  F90.2 314.01 Assessment:   [facility] is responding to treatment. Symptoms are less in occurrence and less in severity. Patient has noticed a slight improvement in mood and decrease in anxiety, but nothing \"drastic\". Reports she experienced some nausea and a headache for about a week, but this has no subsided. Discussed positive coping strategies. Discussed current medications and dosages. Reviewed treatment goals and target symptoms to monitor for. Increased Effexor to 75 mg, encouraged pt to take with food if it upsets her stomach. Denies SI/HI/AH/VH and delusions. Plan:   1. Current Outpatient Medications   Medication Sig Dispense Refill    venlafaxine-SR (EFFEXOR-XR) 75 mg capsule Take 1 Capsule by mouth daily. 30 Capsule 0    PROAIR HFA 90 mcg/actuation inhaler INHALE 2 PUFFS PO Q 4 TO 6 H PRN BREATHING  0          medication changes made today:  Effexor 75 mg PO daily   2. Counseling and coordination of care including instructions for treatment, risks/benefits, risk factor reduction and patient/family education. She agrees with the plan. Patient instructed to call with any side effects, questions or issues. Follow-up and Dispositions    · Return in about 4 weeks (around 5/12/2022) for med/management .             4/14/2022  Sofaí Rogers NP

## 2022-04-14 NOTE — PATIENT INSTRUCTIONS
Words of wisdom  When something bad happens to you, you have 3 choices:  1. Let it DEFINE YOU,  2. Let it DESTROY YOU,   3. OR YOU CAN LET IT STRENGTHEN YOU  4.   Life is like a camera, so:                                · Just focus on the important  · Capture the good times  · Develop from the negatives  · And if things dont turn out, take another shot     Learn from the mistakes of others, you cant live long enough to make them all yourself. Always put yourself in others shoes, if it hurts you, then it probably hurts the other person to. The happiest of people dont necessarily have the best of everything, they just make the most of everything they have and that comes along their way. Life is 10% of what happens to you and 90 % of how you respond to it.      Pleases include the following foods in your diet for mood:  · Drink GREEN TEA as often as you can but stop the caffeinated ones before 6 pm  · Omega 3 Fatty Acids/Fish oil/ or flax seeds, citrus fruits, nina, turmeric, chocolates  · Sunflower seeds, Pumpkin seeds, Almonds,   · Oatmeal, Eggs, grapes, yogurt, Probiotics (like Activia)  · Vit C, E, D every few days,  · Exercise at least 20 minutes/day (walk 10,000 steps)

## 2022-04-14 NOTE — PROGRESS NOTES
Chief Complaint   Patient presents with    Medication Management     Visit Vitals  /79 (BP 1 Location: Right upper arm, BP Patient Position: Sitting, BP Cuff Size: Adult)   Pulse 94   Temp 97.7 °F (36.5 °C) (Temporal)   Resp 16   Ht 4' 11\" (1.499 m)   Wt 60.6 kg (133 lb 9.6 oz)   SpO2 98%   BMI 26.98 kg/m²     Provider to review medications

## 2022-05-19 ENCOUNTER — OFFICE VISIT (OUTPATIENT)
Dept: BEHAVIORAL/MENTAL HEALTH CLINIC | Age: 27
End: 2022-05-19
Payer: MEDICAID

## 2022-05-19 VITALS
TEMPERATURE: 97.9 F | DIASTOLIC BLOOD PRESSURE: 71 MMHG | SYSTOLIC BLOOD PRESSURE: 103 MMHG | WEIGHT: 131.6 LBS | BODY MASS INDEX: 26.53 KG/M2 | HEART RATE: 84 BPM | RESPIRATION RATE: 17 BRPM | OXYGEN SATURATION: 98 % | HEIGHT: 59 IN

## 2022-05-19 DIAGNOSIS — F33.1 MDD (MAJOR DEPRESSIVE DISORDER), RECURRENT EPISODE, MODERATE (HCC): Primary | ICD-10-CM

## 2022-05-19 DIAGNOSIS — F90.2 ADHD (ATTENTION DEFICIT HYPERACTIVITY DISORDER), COMBINED TYPE: ICD-10-CM

## 2022-05-19 PROCEDURE — 99214 OFFICE O/P EST MOD 30 MIN: CPT | Performed by: NURSE PRACTITIONER

## 2022-05-19 RX ORDER — FLUOXETINE 10 MG/1
10 CAPSULE ORAL DAILY
Qty: 30 CAPSULE | Refills: 1 | Status: SHIPPED | OUTPATIENT
Start: 2022-05-19 | End: 2022-06-28 | Stop reason: SDUPTHER

## 2022-05-19 RX ORDER — VENLAFAXINE HYDROCHLORIDE 37.5 MG/1
37.5 CAPSULE, EXTENDED RELEASE ORAL DAILY
Qty: 14 CAPSULE | Refills: 0 | Status: SHIPPED | OUTPATIENT
Start: 2022-05-19 | End: 2022-06-28

## 2022-05-19 NOTE — PROGRESS NOTES
CHIEF COMPLAINT:  Yifan Douglas is a 23 y.o. female and was seen today for follow-up of psychiatric condition and psychotropic medication management. HPI:    Munira Warner reports the following psychiatric symptoms by hx:  depression and anxiety. Overall symptoms have been present for years. Currently depressive symptoms are of moderate severity. The symptoms occur constantly. Pt reports medications are partially effective. Met with pt via  for appt today  to review current treatment plan. FAMILY/SOCIAL HX:   Psychosocial Stressors     REVIEW OF SYSTEMS:  Psychiatric symptoms being monitored for:  depression, anxiety   Appetite:improved   Sleep: good   Neuro: denies     Visit Vitals  /71 (BP 1 Location: Right arm, BP Patient Position: Sitting, BP Cuff Size: Adult)   Pulse 84   Temp 97.9 °F (36.6 °C) (Temporal)   Resp 17   Ht 4' 11\" (1.499 m)   Wt 59.7 kg (131 lb 9.6 oz)   SpO2 98%   BMI 26.58 kg/m²       Side Effects:  none    MENTAL STATUS EXAM:   Sensorium  oriented to time, place and person   Relations cooperative   Appearance:  age appropriate and within normal Limits   Motor Behavior:  within normal limits   Speech:  normal pitch and normal volume   Thought Process: within normal limits   Thought Content free of delusions and free of hallucinations   Suicidal ideations none   Homicidal ideations none   Mood:  within normal limits   Affect:  normal   Memory recent  adequate   Memory remote:  adequate   Concentration:  impaired   Abstraction:  abstract   Insight:  good   Reliability good   Judgment:  good     MEDICAL DECISION MAKING:  Problems addressed today:    ICD-10-CM ICD-9-CM    1. MDD (major depressive disorder), recurrent episode, moderate ([facility])  F33.1 296.32 venlafaxine-SR (EFFEXOR-XR) 37.5 mg capsule      FLUoxetine (PROzac) 10 mg capsule   2. ADHD (attention deficit hyperactivity disorder), combined type  F90.2 314.01          Assessment:   Munira Warner is responding to treatment.  Symptoms are slightly improving but patient feels she did better on Prozac. She reports she has been doing more gardening, but she still is struggling with lack of motivation, guild, low self worth, low energy and poor concentration. Will switch back to propranolol. Discussed current medications and dosages, risk vs benefits and side effects. Educated patient on diagnosis . Reviewed treatment goals and target symptoms to monitor for. Denies SI/HI/AH/VH and delusions. Plan:   1. Current Outpatient Medications   Medication Sig Dispense Refill    venlafaxine-SR (EFFEXOR-XR) 37.5 mg capsule Take 1 Capsule by mouth daily. 14 Capsule 0    FLUoxetine (PROzac) 10 mg capsule Take 1 Capsule by mouth daily. 30 Capsule 1    PROAIR HFA 90 mcg/actuation inhaler INHALE 2 PUFFS PO Q 4 TO 6 H PRN BREATHING  0          medication changes made today: Prozac 10 mg, Effexor 37.5 mg for tapering purposes   2. Counseling and coordination of care including instructions for treatment, risks/benefits, risk factor reduction and patient/family education. She agrees with the plan. Patient instructed to call with any side effects, questions or issues. 3.    Follow-up and Dispositions    · Return in about 5 weeks (around 6/23/2022) for med/management .               5/19/2022  Javier Jones NP

## 2022-05-19 NOTE — PROGRESS NOTES
Identified pt with two pt identifiers(name and ). Reviewed record in preparation for visit and have obtained necessary documentation. All patient medications has been reviewed. Chief Complaint   Patient presents with    Medication Management       3 most recent PHQ Screens 2022   Little interest or pleasure in doing things Several days   Feeling down, depressed, irritable, or hopeless Several days   Total Score PHQ 2 2   Trouble falling or staying asleep, or sleeping too much -   Feeling tired or having little energy -   Poor appetite, weight loss, or overeating -   Feeling bad about yourself - or that you are a failure or have let yourself or your family down -   Trouble concentrating on things such as school, work, reading, or watching TV -   Moving or speaking so slowly that other people could have noticed; or the opposite being so fidgety that others notice -   Thoughts of being better off dead, or hurting yourself in some way -   PHQ 9 Score -   How difficult have these problems made it for you to do your work, take care of your home and get along with others -     Abuse Screening Questionnaire 2022   Do you ever feel afraid of your partner? N   Are you in a relationship with someone who physically or mentally threatens you? N   Is it safe for you to go home? Y       Health Maintenance Due   Topic    Hepatitis C Screening     COVID-19 Vaccine (1)    Pneumococcal 0-64 years (1 - PCV)    HPV Age 9Y-34Y (1 - 2-dose series)    DTaP/Tdap/Td series (1 - Tdap)    Pap Smear      Health Maintenance Review: Patient reminded of \"due or due soon\" health maintenance. I have asked the patient to contact his/her primary care provider (PCP) for follow-up on his/her health maintenance.     Vitals:    22 1004   BP: 103/71   Pulse: 84   Resp: 17   Temp: 97.9 °F (36.6 °C)   TempSrc: Temporal   SpO2: 98%   Weight: 131 lb 9.6 oz (59.7 kg)   Height: 4' 11\" (1.499 m)   PainSc:   0 - No pain       Wt Readings from Last 3 Encounters:   05/19/22 131 lb 9.6 oz (59.7 kg)   04/14/22 133 lb 9.6 oz (60.6 kg)   03/23/22 136 lb 6.4 oz (61.9 kg)     Temp Readings from Last 3 Encounters:   05/19/22 97.9 °F (36.6 °C) (Temporal)   04/14/22 97.7 °F (36.5 °C) (Temporal)   03/23/22 97.9 °F (36.6 °C) (Temporal)     BP Readings from Last 3 Encounters:   05/19/22 103/71   04/14/22 115/79   03/23/22 119/78     Pulse Readings from Last 3 Encounters:   05/19/22 84   04/14/22 94   03/23/22 89       1. \"Have you been to the ER, urgent care clinic since your last visit? Hospitalized since your last visit? \" No    2. \"Have you seen or consulted any other health care providers outside of the [address] [place] [address] since your last visit? \" No

## 2022-05-19 NOTE — PATIENT INSTRUCTIONS
Take Effexor 37.5 mg by mouth daily for 7 days, take 37.5 mg by mouth daily every other day for 7 days.    On day 8 start Prozac 10 mg by mouth daily

## 2022-06-28 ENCOUNTER — VIRTUAL VISIT (OUTPATIENT)
Dept: BEHAVIORAL/MENTAL HEALTH CLINIC | Age: 27
End: 2022-06-28
Payer: MEDICAID

## 2022-06-28 DIAGNOSIS — F90.2 ADHD (ATTENTION DEFICIT HYPERACTIVITY DISORDER), COMBINED TYPE: ICD-10-CM

## 2022-06-28 DIAGNOSIS — F33.1 MDD (MAJOR DEPRESSIVE DISORDER), RECURRENT EPISODE, MODERATE (HCC): Primary | ICD-10-CM

## 2022-06-28 PROCEDURE — 99214 OFFICE O/P EST MOD 30 MIN: CPT | Performed by: NURSE PRACTITIONER

## 2022-06-28 RX ORDER — FLUOXETINE HYDROCHLORIDE 20 MG/1
20 CAPSULE ORAL DAILY
Qty: 30 CAPSULE | Refills: 1 | Status: SHIPPED | OUTPATIENT
Start: 2022-06-28 | End: 2022-08-26 | Stop reason: SDUPTHER

## 2022-06-28 NOTE — PROGRESS NOTES
CHIEF COMPLAINT:  Toribio Curiel is a 23 y.o. female and was seen today for follow-up of psychiatric condition and psychotropic medication management. HPI:    Gavin Chavez reports the following psychiatric symptoms by hx:  depression and anxiety. Overall symptoms have been present for years. Currently depressive symptoms are of moderate severity. The symptoms occur constantly. Pt reports medications are partially effective. Met with pt via for appt today to review current treatment plan. FAMILY/SOCIAL HX:   Psychosocial Stressors     REVIEW OF SYSTEMS:  Psychiatric symptoms being monitored for:  depression, anxiety   Appetite:improved   Sleep: good   Neuro: denies     There were no vitals taken for this visit. Side Effects:  none    MENTAL STATUS EXAM:   Sensorium  oriented to time, place and person   Relations cooperative   Appearance:  age appropriate and within normal Limits   Motor Behavior:  within normal limits   Speech:  normal pitch and normal volume   Thought Process: within normal limits   Thought Content free of delusions and free of hallucinations   Suicidal ideations none   Homicidal ideations none   Mood:  within normal limits   Affect:  normal   Memory recent  adequate   Memory remote:  adequate   Concentration:  impaired   Abstraction:  abstract   Insight:  good   Reliability good   Judgment:  good         MEDICAL DECISION MAKING:  Problems addressed today:    ICD-10-CM ICD-9-CM    1. MDD (major depressive disorder), recurrent episode, moderate ([facility])  F33.1 296.32 FLUoxetine (PROzac) 20 mg capsule   2. ADHD (attention deficit hyperactivity disorder), combined type  F90.2 314.01            Assessment:   Gavin Chavez is partially responding to treatment. Symptoms are less in severity, but patient continues to struggle with breakthrough symptoms. Discussed current medications and dosages. Will increase Prozac 20 mg, risk vs benefits discussed side effects. Reinforced positive coping strategies. Reviewed treatment goals and target symptoms to monitor for. Denies SI/HI/AH/VH and delusions. Plan:   1. Current Outpatient Medications   Medication Sig Dispense Refill    FLUoxetine (PROzac) 20 mg capsule Take 1 Capsule by mouth daily. 30 Capsule 1    PROAIR HFA 90 mcg/actuation inhaler INHALE 2 PUFFS PO Q 4 TO 6 H PRN BREATHING  0          medication changes made today: Prozac 20 mg po daily    2. Counseling and coordination of care including instructions for treatment, risks/benefits, risk factor reduction and patient/family education. She agrees with the plan. Patient instructed to call with any side effects, questions or issues. Kateri Mcardle, was evaluated through a synchronous (real-time) audio-video encounter. The patient (or guardian if applicable) is aware that this is a billable service, which includes applicable co-pays. This Virtual Visit was conducted with patient's (and/or legal guardian's) consent. The visit was conducted pursuant to the emergency declaration under the [facility], [address] [place] [address] authority and the [employer]. Patient identification was verified, and a caregiver was present when appropriate. The patient was located at: Home: [address]  The provider was located at: Facility ([facility] Department): [address]       An electronic signature was used to authenticate this note.   -- Forrest Florian NP     6/28/2022

## 2022-08-03 DIAGNOSIS — F33.1 MDD (MAJOR DEPRESSIVE DISORDER), RECURRENT EPISODE, MODERATE (HCC): ICD-10-CM

## 2022-08-03 NOTE — TELEPHONE ENCOUNTER
Patient is requesting a refill:    Requested Prescriptions     Pending Prescriptions Disp Refills    FLUoxetine (PROzac) 20 mg capsule 30 Capsule 1     Sig: Take 1 Capsule by mouth in the morning.

## 2022-08-04 RX ORDER — FLUOXETINE HYDROCHLORIDE 20 MG/1
20 CAPSULE ORAL DAILY
Qty: 30 CAPSULE | Refills: 1 | OUTPATIENT
Start: 2022-08-04

## 2022-08-26 ENCOUNTER — VIRTUAL VISIT (OUTPATIENT)
Dept: BEHAVIORAL/MENTAL HEALTH CLINIC | Age: 27
End: 2022-08-26
Payer: MEDICAID

## 2022-08-26 DIAGNOSIS — F33.1 MDD (MAJOR DEPRESSIVE DISORDER), RECURRENT EPISODE, MODERATE (HCC): ICD-10-CM

## 2022-08-26 DIAGNOSIS — F90.2 ADHD (ATTENTION DEFICIT HYPERACTIVITY DISORDER), COMBINED TYPE: Primary | ICD-10-CM

## 2022-08-26 PROCEDURE — 99214 OFFICE O/P EST MOD 30 MIN: CPT | Performed by: NURSE PRACTITIONER

## 2022-08-26 RX ORDER — DEXTROAMPHETAMINE SACCHARATE, AMPHETAMINE ASPARTATE, DEXTROAMPHETAMINE SULFATE AND AMPHETAMINE SULFATE 2.5; 2.5; 2.5; 2.5 MG/1; MG/1; MG/1; MG/1
5 TABLET ORAL 2 TIMES DAILY
Qty: 30 TABLET | Refills: 0 | Status: SHIPPED | OUTPATIENT
Start: 2022-08-26 | End: 2022-10-13 | Stop reason: SDUPTHER

## 2022-08-26 RX ORDER — FLUOXETINE HYDROCHLORIDE 20 MG/1
20 CAPSULE ORAL DAILY
Qty: 30 CAPSULE | Refills: 2 | Status: SHIPPED | OUTPATIENT
Start: 2022-08-26

## 2022-08-26 NOTE — PROGRESS NOTES
CHIEF COMPLAINT:  Laine Vasquez is a 33 y.o. female and was seen today for follow-up of psychiatric condition and psychotropic medication management. HPI:    Lewis Cleary reports the following psychiatric symptoms by hx:  depression and anxiety. Overall symptoms have been present for years. Currently depressive symptoms are of moderate severity. The symptoms occur constantly. Pt reports medications are partially effective. Met with pt via telehealth for appt today to review current treatment plan. FAMILY/SOCIAL HX:   Psychosocial Stressors      REVIEW OF SYSTEMS:  Psychiatric symptoms being monitored for:  depression, anxiety   Appetite:improved   Sleep: good   Neuro: denies     There were no vitals taken for this visit. Side Effects:  none    MENTAL STATUS EXAM:   Sensorium  oriented to time, place and person   Relations cooperative   Appearance:  age appropriate and within normal Limits   Motor Behavior:  within normal limits   Speech:  normal pitch and normal volume   Thought Process: within normal limits   Thought Content free of delusions and free of hallucinations   Suicidal ideations none   Homicidal ideations none   Mood:  within normal limits   Affect:  normal   Memory recent  adequate   Memory remote:  adequate   Concentration:  impaired   Abstraction:  abstract   Insight:  good   Reliability good   Judgment:  good     MEDICAL DECISION MAKING:  Problems addressed today:    ICD-10-CM ICD-9-CM    1. ADHD (attention deficit hyperactivity disorder), combined type  F90.2 314.01 dextroamphetamine-amphetamine (ADDERALL) 10 mg tablet      10-DRUG SCREEN, URINE W RFLX CONFIRMATION      10-DRUG SCREEN, URINE W RFLX CONFIRMATION      2. MDD (major depressive disorder), recurrent episode, moderate (formerly [facility])  F33.1 296.32 FLUoxetine (PROzac) 20 mg capsule          Assessment:   Lewis Cleary is responding to treatment. Symptoms are improved but patient continues to struggle with mild depressive symptoms .  She has difficulty getting started with tasks, completing task, procrastination, organization, focus, and feels overwhelmed. She has had these symptoms since childhood and was treated with Adderall. Patient adult ADHD screening tools shows symptoms, and patient meets criteria for ADHD combined type. Discussed current medications and dosages. Will start patient on Adderall 5 mg BID as not treating ADHD symptoms may result in exacerbations of depressive and anxiety symptoms. Patient denies cardiac hx and is aware of risks vs benefits as well as controlled substance policy. If she continues to struggle with depression will increase Prozac at next visit. Reinforced positive symptoms and will continue to monitor. Reviewed treatment goals and target symptoms to monitor for. Plan:   1. Current Outpatient Medications   Medication Sig Dispense Refill    dextroamphetamine-amphetamine (ADDERALL) 10 mg tablet Take 0.5 Tablets by mouth two (2) times a day. Max Daily Amount: 10 mg. 30 Tablet 0    FLUoxetine (PROzac) 20 mg capsule Take 1 Capsule by mouth daily. 30 Capsule 2    PROAIR HFA 90 mcg/actuation inhaler INHALE 2 PUFFS PO Q 4 TO 6 H PRN BREATHING  0          medication changes made today: Adderall 5 mg po BID     2. Counseling and coordination of care including instructions for treatment, risks/benefits, risk factor reduction and patient/family education. She agrees with the plan. Patient instructed to call with any side effects, questions or issues. Don Mcgrath, was evaluated through a synchronous (real-time) audio-video encounter. The patient (or guardian if applicable) is aware that this is a billable service, which includes applicable co-pays. This Virtual Visit was conducted with patient's (and/or legal guardian's) consent.  The visit was conducted pursuant to the emergency declaration under the [address] [address] waiver authority and the [place] [employer] Supplemental Appropriations Act. Patient identification was verified, and a caregiver was present when appropriate. The patient was located at: Home: [address]  The provider was located at: Facility ([facility] [place] [address] [place] [address]       An electronic signature was used to authenticate this note.   -- Estill Curling, NP         8/26/2022

## 2022-08-31 ENCOUNTER — TELEPHONE (OUTPATIENT)
Dept: BEHAVIORAL/MENTAL HEALTH CLINIC | Age: 27
End: 2022-08-31

## 2022-08-31 NOTE — TELEPHONE ENCOUNTER
Spoke with patient regarding Adderall prior authorization. Two identifiers confirmed. Patient stated she has not gone for the drug screen, but plans to go in the near future. Informed patient this office received prior auth from [facility]. There is a anuj period of two- three weeks before prior [place] will be denied. Patient encouraged to have drug screen soon. Patient expressed an understanding and had no further questions.

## 2022-09-12 ENCOUNTER — TELEPHONE (OUTPATIENT)
Dept: BEHAVIORAL/MENTAL HEALTH CLINIC | Age: 27
End: 2022-09-12

## 2022-09-12 NOTE — TELEPHONE ENCOUNTER
Contacted patient regarding urine drug screen. Two identifiers confirmed. Patient stated she went to [facility] on Friday 09.09.22. Will wait for test results.

## 2022-09-14 LAB
ALPRAZ UR QL: NEGATIVE
AMPHETAMINES UR QL SCN: NEGATIVE NG/ML
BARBITURATES UR QL SCN: NEGATIVE NG/ML
BENZODIAZ UR QL: NEGATIVE NG/ML
BZE UR QL SCN: NEGATIVE NG/ML
CANNABINOIDS UR QL SCN: NEGATIVE NG/ML
CLONAZEPAM UR QL: NEGATIVE
CREAT UR-MCNC: 190.6 MG/DL (ref 20–300)
FLURAZEPAM UR QL: NEGATIVE
LORAZEPAM UR QL: NEGATIVE
METHADONE UR QL SCN: NEGATIVE NG/ML
MIDAZOLAM UR QL CFM: NEGATIVE
NORDIAZEPAM UR QL: NEGATIVE
OPIATES UR QL SCN: NEGATIVE NG/ML
OXAZEPAM UR QL: NEGATIVE
OXYCODONE+OXYMORPHONE UR QL SCN: NEGATIVE NG/ML
PCP UR QL: NEGATIVE NG/ML
PH UR: 7.4 [PH] (ref 4.5–8.9)
PLEASE NOTE:, 733157: NORMAL
PROPOXYPH UR QL SCN: NEGATIVE NG/ML
SP GR UR: 1.02
TEMAZEPAM UR QL CFM: NEGATIVE
TRIAZOLAM UR QL: NEGATIVE

## 2022-09-20 ENCOUNTER — DOCUMENTATION ONLY (OUTPATIENT)
Dept: BEHAVIORAL/MENTAL HEALTH CLINIC | Age: 27
End: 2022-09-20

## 2022-09-20 NOTE — PROGRESS NOTES
Called patient insurance ([employer]) regarding prior auth status for Adderall. Insurance stated the prior [place] was rejected because Emir Eli is secondary. Patient has another primary insurance. When asked how the rep knew she had a primary, representative stated there was a \"message chat\" between patient and representative. Left vm for patient to submit primary insurance with [facility] so they can resubmit the claim.

## 2022-09-28 ENCOUNTER — TELEPHONE (OUTPATIENT)
Dept: BEHAVIORAL/MENTAL HEALTH CLINIC | Age: 27
End: 2022-09-28

## 2022-09-28 NOTE — TELEPHONE ENCOUNTER
Spoke to patient and two identifiers confirmed. Patient stated she received my vm regarding another insurance as primary. Patient stated she was under her parents insurance, which was dc in 2020. Patient contacted [facility]. They fixed the problem, but said it would take 1-2 weeks to remedy. Patient will call insurance within the next week for updates. Patient will contact this nurse with updates to when prior authorization can be submitted again. Patient had no further comments or questions.

## 2022-10-13 ENCOUNTER — VIRTUAL VISIT (OUTPATIENT)
Dept: BEHAVIORAL/MENTAL HEALTH CLINIC | Age: 27
End: 2022-10-13
Payer: MEDICAID

## 2022-10-13 DIAGNOSIS — F90.2 ADHD (ATTENTION DEFICIT HYPERACTIVITY DISORDER), COMBINED TYPE: ICD-10-CM

## 2022-10-13 DIAGNOSIS — F33.1 MDD (MAJOR DEPRESSIVE DISORDER), RECURRENT EPISODE, MODERATE (HCC): Primary | ICD-10-CM

## 2022-10-13 PROCEDURE — 99214 OFFICE O/P EST MOD 30 MIN: CPT | Performed by: NURSE PRACTITIONER

## 2022-10-13 RX ORDER — DEXTROAMPHETAMINE SACCHARATE, AMPHETAMINE ASPARTATE, DEXTROAMPHETAMINE SULFATE AND AMPHETAMINE SULFATE 2.5; 2.5; 2.5; 2.5 MG/1; MG/1; MG/1; MG/1
5 TABLET ORAL 2 TIMES DAILY
Qty: 30 TABLET | Refills: 0 | Status: SHIPPED | OUTPATIENT
Start: 2022-10-13 | End: 2022-10-19 | Stop reason: SDUPTHER

## 2022-10-13 NOTE — PROGRESS NOTES
CHIEF COMPLAINT:  Rafat Drew is a 33 y.o. female and was seen today for follow-up of psychiatric condition and psychotropic medication management. HPI:    Can Hale reports the following psychiatric symptoms by hx:  depression and anxiety. Overall symptoms have been present for years. Currently depressive symptoms are of moderate severity. The symptoms occur constantly. Pt reports medications are partially effective. She has not started Adderall due to issues with insurance. Met with pt via telehealth for appt today to review current treatment plan. FAMILY/SOCIAL HX:   Psychosocial Stressors      REVIEW OF SYSTEMS:  Psychiatric symptoms being monitored for:  depression, anxiety   Appetite:improved   Sleep: good   Neuro: denies     There were no vitals taken for this visit. Side Effects:  none    MENTAL STATUS EXAM:   Sensorium  oriented to time, place and person   Relations cooperative   Appearance:  age appropriate and within normal Limits   Motor Behavior:  within normal limits   Speech:  normal pitch and normal volume   Thought Process: within normal limits   Thought Content free of delusions and free of hallucinations   Suicidal ideations none   Homicidal ideations none   Mood:  within normal limits   Affect:  normal   Memory recent  adequate   Memory remote:  adequate   Concentration:  impaired   Abstraction:  abstract   Insight:  good   Reliability good   Judgment:  good     MEDICAL DECISION MAKING:  Problems addressed today:    ICD-10-CM ICD-9-CM    1. ADHD (attention deficit hyperactivity disorder), combined type  F90.2 314.01 dextroamphetamine-amphetamine (ADDERALL) 10 mg tablet              Assessment:   Can Hale is not responding to treatment. Symptoms are have not improved. She reports not taking Adderall due to a mix up with her insurance. Patient would rather not increase Prozac at the time. Discussed current medications and dosages.  No changes made today will send Adderall to another pharmacy and follow up. Reviewed treatment goals and target symptoms to monitor for. Plan:   1. Current Outpatient Medications   Medication Sig Dispense Refill    dextroamphetamine-amphetamine (ADDERALL) 10 mg tablet Take 0.5 Tablets by mouth two (2) times a day for 30 days. Max Daily Amount: 10 mg. 30 Tablet 0    FLUoxetine (PROzac) 20 mg capsule Take 1 Capsule by mouth daily. 30 Capsule 2    PROAIR HFA 90 mcg/actuation inhaler INHALE 2 PUFFS PO Q 4 TO 6 H PRN BREATHING  0          medication changes made today: None     2. Counseling and coordination of care including instructions for treatment, risks/benefits, risk factor reduction and patient/family education. She agrees with the plan. Patient instructed to call with any side effects, questions or issues. Lisset Pate was evaluated through a synchronous (real-time) audio-video encounter. The patient (or guardian if applicable) is aware that this is a billable service, which includes applicable co-pays. This Virtual Visit was conducted with patient's (and/or legal guardian's) consent. The visit was conducted pursuant to the emergency declaration under the [address] [place] [address], [address] [place] [address] [facility] authority and the [employer] General Act. Patient identification was verified, and a caregiver was present when appropriate. The patient was located at: Home: [address]  The provider was located at: [facility] [address]       An electronic signature was used to authenticate this note.   -- Evangelina Feldman NP       10/13/2022

## 2022-10-19 DIAGNOSIS — F90.2 ADHD (ATTENTION DEFICIT HYPERACTIVITY DISORDER), COMBINED TYPE: ICD-10-CM

## 2022-10-19 NOTE — TELEPHONE ENCOUNTER
Patient is requesting prescription be resent to [facility]. Patient stated Pharmacists told her that there system has been glitching and that they never received a prescription for the patient on 10/13/22. Requested Prescriptions     Pending Prescriptions Disp Refills    dextroamphetamine-amphetamine (ADDERALL) 10 mg tablet 30 Tablet 0     Sig: Take 0.5 Tablets by mouth two (2) times a day for 30 days. Max Daily Amount: 10 mg.

## 2022-10-20 ENCOUNTER — TELEPHONE (OUTPATIENT)
Dept: BEHAVIORAL/MENTAL HEALTH CLINIC | Age: 27
End: 2022-10-20

## 2022-10-20 RX ORDER — DEXTROAMPHETAMINE SACCHARATE, AMPHETAMINE ASPARTATE, DEXTROAMPHETAMINE SULFATE AND AMPHETAMINE SULFATE 2.5; 2.5; 2.5; 2.5 MG/1; MG/1; MG/1; MG/1
5 TABLET ORAL 2 TIMES DAILY
Qty: 30 TABLET | Refills: 0 | Status: SHIPPED | OUTPATIENT
Start: 2022-10-20 | End: 2022-11-19

## 2022-10-20 NOTE — TELEPHONE ENCOUNTER
Patient called stating the the Adderall that was sent to [facility] can no longer be filled. Patient stated the pharmacy has meet their quota for the month. [facility], but was not discontinued at the pharmacy. [facility] stated the drug required a prior auth which was obtained. Authorizaotion number: [id number].  10/20/22-10/20/23. [facility] stated they were able to fill and will contact patient once prescription was ready for . Patient informed of above information. Patient had no further questions.

## 2022-12-08 ENCOUNTER — OFFICE VISIT (OUTPATIENT)
Dept: BEHAVIORAL/MENTAL HEALTH CLINIC | Age: 27
End: 2022-12-08
Payer: MEDICAID

## 2022-12-08 VITALS
DIASTOLIC BLOOD PRESSURE: 73 MMHG | OXYGEN SATURATION: 99 % | SYSTOLIC BLOOD PRESSURE: 108 MMHG | HEIGHT: 59 IN | WEIGHT: 129.6 LBS | HEART RATE: 91 BPM | BODY MASS INDEX: 26.13 KG/M2 | TEMPERATURE: 97.8 F | RESPIRATION RATE: 17 BRPM

## 2022-12-08 DIAGNOSIS — F33.1 MDD (MAJOR DEPRESSIVE DISORDER), RECURRENT EPISODE, MODERATE (HCC): Primary | ICD-10-CM

## 2022-12-08 DIAGNOSIS — F90.2 ADHD (ATTENTION DEFICIT HYPERACTIVITY DISORDER), COMBINED TYPE: ICD-10-CM

## 2022-12-08 PROCEDURE — 99214 OFFICE O/P EST MOD 30 MIN: CPT | Performed by: NURSE PRACTITIONER

## 2022-12-08 RX ORDER — DEXTROAMPHETAMINE SACCHARATE, AMPHETAMINE ASPARTATE, DEXTROAMPHETAMINE SULFATE AND AMPHETAMINE SULFATE 2.5; 2.5; 2.5; 2.5 MG/1; MG/1; MG/1; MG/1
TABLET ORAL
Qty: 45 TABLET | Refills: 0 | Status: SHIPPED | OUTPATIENT
Start: 2022-12-08

## 2022-12-08 RX ORDER — FLUOXETINE HYDROCHLORIDE 20 MG/1
20 CAPSULE ORAL DAILY
Qty: 30 CAPSULE | Refills: 2 | Status: SHIPPED | OUTPATIENT
Start: 2022-12-08

## 2022-12-08 NOTE — PROGRESS NOTES
Chief Complaint   Patient presents with    Medication Management     Visit Vitals  /73 (BP 1 Location: Left upper arm, BP Patient Position: Sitting, BP Cuff Size: Adult)   Pulse 91   Temp 97.8 °F (36.6 °C) (Oral)   Resp 17   Ht 4' 11\" (1.499 m)   Wt 58.8 kg (129 lb 9.6 oz)   SpO2 99%   BMI 26.18 kg/m²     3 most recent PHQ Screens 12/8/2022   Little interest or pleasure in doing things Several days   Feeling down, depressed, irritable, or hopeless Several days   Total Score PHQ 2 2   Trouble falling or staying asleep, or sleeping too much -   Feeling tired or having little energy -   Poor appetite, weight loss, or overeating -   Feeling bad about yourself - or that you are a failure or have let yourself or your family down -   Trouble concentrating on things such as school, work, reading, or watching TV -   Moving or speaking so slowly that other people could have noticed; or the opposite being so fidgety that others notice -   Thoughts of being better off dead, or hurting yourself in some way -   PHQ 9 Score -   How difficult have these problems made it for you to do your work, take care of your home and get along with others -     1. Have you been to the ER, urgent care clinic since your last visit? Hospitalized since your last visit? No    2. Have you seen or consulted any other health care providers outside of the [address] [place] [address] since your last visit? Include any pap smears or colon screening.  No

## 2022-12-08 NOTE — PROGRESS NOTES
CHIEF COMPLAINT:  Latonia Chavarria is a 33 y.o. female and was seen today for follow-up of psychiatric condition and psychotropic medication management. HPI:    Flavia Delgado reports the following psychiatric symptoms by hx:  depression and anxiety. Overall symptoms have been present for years. Currently depressive symptoms are of moderate severity. The symptoms occur constantly. Pt reports medications are effective. Met with for appt today to review current treatment plan. FAMILY/SOCIAL HX:   Psychosocial Stressors      REVIEW OF SYSTEMS:  Psychiatric symptoms being monitored for:  depression, anxiety   Appetite:improved   Sleep: good   Neuro: denies     Visit Vitals  /73 (BP 1 Location: Left upper arm, BP Patient Position: Sitting, BP Cuff Size: Adult)   Pulse 91   Temp 97.8 °F (36.6 °C) (Oral)   Resp 17   Ht 4' 11\" (1.499 m)   Wt 58.8 kg (129 lb 9.6 oz)   SpO2 99%   BMI 26.18 kg/m²       Side Effects:  none    MENTAL STATUS EXAM:   Sensorium  oriented to time, place and person   Relations cooperative   Appearance:  age appropriate and within normal Limits   Motor Behavior:  within normal limits   Speech:  normal pitch and normal volume   Thought Process: within normal limits   Thought Content free of delusions and free of hallucinations   Suicidal ideations none   Homicidal ideations none   Mood:  within normal limits   Affect:  normal   Memory recent  adequate   Memory remote:  adequate   Concentration:  impaired   Abstraction:  abstract   Insight:  good   Reliability good   Judgment:  good     MEDICAL DECISION MAKING:  Problems addressed today:    ICD-10-CM ICD-9-CM    1. MDD (major depressive disorder), recurrent episode, moderate (HCC)  F33.1 296.32 FLUoxetine (PROzac) 20 mg capsule      2. ADHD (attention deficit hyperactivity disorder), combined type  F90.2 314.01 dextroamphetamine-amphetamine (ADDERALL) 10 mg tablet        Assessment:   Flavia Delgado is responding to treatment.  Symptoms are less in occurrence and less in severity. She states the 5 mg is not as effective as it once was. Reinforced positive coping strategies. Discussed current medications and dosages. Will change to 10 mg in the morning and 5 mg at noon. Reviewed treatment goals and target symptoms to monitor for. Will continue to monitor. Plan:   1. Current Outpatient Medications   Medication Sig Dispense Refill    FLUoxetine (PROzac) 20 mg capsule Take 1 Capsule by mouth daily. 30 Capsule 2    dextroamphetamine-amphetamine (ADDERALL) 10 mg tablet Take 1 whole tablet by mouth in the AM and 1/2 tablet by mouth at noon. 45 Tablet 0    PROAIR HFA 90 mcg/actuation inhaler INHALE 2 PUFFS PO Q 4 TO 6 H PRN BREATHING  0          medication changes made today: Adderall 10 mg in the am and 5 mg at noon. 2.  Counseling and coordination of care including instructions for treatment, risks/benefits, risk factor reduction and patient/family education. She agrees with the plan. Patient instructed to call with any side effects, questions or issues.          12/8/2022  Carley Matson NP

## 2023-01-08 DIAGNOSIS — F90.2 ADHD (ATTENTION DEFICIT HYPERACTIVITY DISORDER), COMBINED TYPE: ICD-10-CM

## 2023-01-09 RX ORDER — DEXTROAMPHETAMINE SACCHARATE, AMPHETAMINE ASPARTATE, DEXTROAMPHETAMINE SULFATE AND AMPHETAMINE SULFATE 2.5; 2.5; 2.5; 2.5 MG/1; MG/1; MG/1; MG/1
TABLET ORAL
Qty: 45 TABLET | Refills: 0 | Status: SHIPPED | OUTPATIENT
Start: 2023-01-09

## 2023-01-09 NOTE — TELEPHONE ENCOUNTER
Last visti: 12.08.22  Next visit: 02.08.23    :  2/08/2022 12/08/2022 1 Dextroamp-Amphetamin 10 Mg Tab  45.00 [address]

## 2023-02-07 ENCOUNTER — VIRTUAL VISIT (OUTPATIENT)
Dept: BEHAVIORAL/MENTAL HEALTH CLINIC | Age: 28
End: 2023-02-07
Payer: MEDICAID

## 2023-02-07 DIAGNOSIS — F33.1 MDD (MAJOR DEPRESSIVE DISORDER), RECURRENT EPISODE, MODERATE (HCC): Primary | ICD-10-CM

## 2023-02-07 DIAGNOSIS — F90.2 ADHD (ATTENTION DEFICIT HYPERACTIVITY DISORDER), COMBINED TYPE: ICD-10-CM

## 2023-02-07 PROCEDURE — 99214 OFFICE O/P EST MOD 30 MIN: CPT | Performed by: NURSE PRACTITIONER

## 2023-02-07 RX ORDER — DEXTROAMPHETAMINE SACCHARATE, AMPHETAMINE ASPARTATE, DEXTROAMPHETAMINE SULFATE AND AMPHETAMINE SULFATE 2.5; 2.5; 2.5; 2.5 MG/1; MG/1; MG/1; MG/1
TABLET ORAL
Qty: 45 TABLET | Refills: 0 | Status: SHIPPED | OUTPATIENT
Start: 2023-02-07

## 2023-02-07 NOTE — PROGRESS NOTES
CHIEF COMPLAINT:  Tootie Cody is a 33 y.o. female and was seen today for follow-up of psychiatric condition and psychotropic medication management. HPI:    Leonela Sprageu reports the following psychiatric symptoms by hx:  depression and anxiety. Overall symptoms have been present for years. Currently depressive symptoms are of moderate severity. The symptoms occur constantly. Pt reports medications are effective. Met with patient via telehealth video for appt today to review current treatment plan. FAMILY/SOCIAL HX:   Psychosocial Stressors      REVIEW OF SYSTEMS:  Psychiatric symptoms being monitored for:  depression, anxiety   Appetite:improved   Sleep: good   Neuro: denies     There were no vitals taken for this visit. Side Effects:  none    MENTAL STATUS EXAM:   Sensorium  oriented to time, place and person   Relations cooperative   Appearance:  age appropriate and within normal Limits   Motor Behavior:  within normal limits   Speech:  normal pitch and normal volume   Thought Process: within normal limits   Thought Content free of delusions and free of hallucinations   Suicidal ideations none   Homicidal ideations none   Mood:  within normal limits   Affect:  normal   Memory recent  adequate   Memory remote:  adequate   Concentration:  impaired   Abstraction:  abstract   Insight:  good   Reliability good   Judgment:  good        MEDICAL DECISION MAKING:  Problems addressed today:    ICD-10-CM ICD-9-CM    1. MDD (major depressive disorder), recurrent episode, moderate ([facility])  F33.1 296.32       2. ADHD (attention deficit hyperactivity disorder), combined type  F90.2 314.01 dextroamphetamine-amphetamine (ADDERALL) 10 mg tablet            Assessment:   Leonela Sprague is responding to treatment. Symptoms are improved. Symptoms are less in severity and less in occurrence. Discussed current medications and dosages. No changes. Will continue to monitor.  Reviewed treatment goals and target symptoms to monitor for.     Plan:   1. Current Outpatient Medications   Medication Sig Dispense Refill    dextroamphetamine-amphetamine (ADDERALL) 10 mg tablet Take 1 whole tablet by mouth in the AM and 1/2 tablet by mouth at noon. 45 Tablet 0    FLUoxetine (PROzac) 20 mg capsule Take 1 Capsule by mouth daily. 30 Capsule 2    PROAIR HFA 90 mcg/actuation inhaler INHALE 2 PUFFS PO Q 4 TO 6 H PRN BREATHING  0          medication changes made today: no changes     2. Counseling and coordination of care including instructions for treatment, risks/benefits, risk factor reduction and patient/family education. She agrees with the plan. Patient instructed to call with any side effects, questions or issues. Jorge Dubose, was evaluated through a synchronous (real-time) audio-video encounter. The patient (or guardian if applicable) is aware that this is a billable service, which includes applicable co-pays. This Virtual Visit was conducted with patient's (and/or legal guardian's) consent. The visit was conducted pursuant to the emergency declaration under the [address] [place] [address], [address] [place] [address] authority and the [employer] General Act. Patient identification was verified, and a caregiver was present when appropriate. The patient was located at: Home: [address]  The provider was located at: [facility] [address] [place] [address]       An electronic signature was used to authenticate this note.   -- Nathaniel Long, DIANE       2/7/2023

## 2023-05-02 ENCOUNTER — VIRTUAL VISIT (OUTPATIENT)
Dept: BEHAVIORAL/MENTAL HEALTH CLINIC | Age: 28
End: 2023-05-02
Payer: MEDICAID

## 2023-05-02 DIAGNOSIS — F90.2 ADHD (ATTENTION DEFICIT HYPERACTIVITY DISORDER), COMBINED TYPE: ICD-10-CM

## 2023-05-02 DIAGNOSIS — F33.1 MDD (MAJOR DEPRESSIVE DISORDER), RECURRENT EPISODE, MODERATE (HCC): ICD-10-CM

## 2023-05-02 PROCEDURE — 99214 OFFICE O/P EST MOD 30 MIN: CPT | Performed by: NURSE PRACTITIONER

## 2023-05-02 RX ORDER — FLUOXETINE HYDROCHLORIDE 20 MG/1
20 CAPSULE ORAL DAILY
Qty: 30 CAPSULE | Refills: 1 | Status: SHIPPED | OUTPATIENT
Start: 2023-05-02

## 2023-05-02 RX ORDER — FLUOXETINE HYDROCHLORIDE 20 MG/1
20 CAPSULE ORAL DAILY
Qty: 30 CAPSULE | Refills: 1 | Status: SHIPPED | OUTPATIENT
Start: 2023-05-02 | End: 2023-05-02 | Stop reason: SDUPTHER

## 2023-05-02 RX ORDER — DEXTROAMPHETAMINE SACCHARATE, AMPHETAMINE ASPARTATE, DEXTROAMPHETAMINE SULFATE AND AMPHETAMINE SULFATE 2.5; 2.5; 2.5; 2.5 MG/1; MG/1; MG/1; MG/1
TABLET ORAL
Qty: 45 TABLET | Refills: 0 | Status: SHIPPED | OUTPATIENT
Start: 2023-05-02

## 2023-05-18 RX ORDER — DEXTROAMPHETAMINE SACCHARATE, AMPHETAMINE ASPARTATE, DEXTROAMPHETAMINE SULFATE AND AMPHETAMINE SULFATE 2.5; 2.5; 2.5; 2.5 MG/1; MG/1; MG/1; MG/1
TABLET ORAL
COMMUNITY
Start: 2023-05-02 | End: 2023-07-05 | Stop reason: SDUPTHER

## 2023-05-18 RX ORDER — FLUOXETINE HYDROCHLORIDE 20 MG/1
20 CAPSULE ORAL DAILY
COMMUNITY
Start: 2023-05-02 | End: 2023-07-05 | Stop reason: SDUPTHER

## 2023-05-18 RX ORDER — ALBUTEROL SULFATE 90 UG/1
AEROSOL, METERED RESPIRATORY (INHALATION)
COMMUNITY
Start: 2017-08-31

## 2023-07-03 DIAGNOSIS — F33.1 MAJOR DEPRESSIVE DISORDER, RECURRENT, MODERATE (HCC): Primary | ICD-10-CM

## 2023-07-03 DIAGNOSIS — F90.2 ATTENTION-DEFICIT HYPERACTIVITY DISORDER, COMBINED TYPE: ICD-10-CM

## 2023-07-05 RX ORDER — DEXTROAMPHETAMINE SACCHARATE, AMPHETAMINE ASPARTATE, DEXTROAMPHETAMINE SULFATE AND AMPHETAMINE SULFATE 2.5; 2.5; 2.5; 2.5 MG/1; MG/1; MG/1; MG/1
TABLET ORAL
Qty: 45 TABLET | Refills: 0 | Status: SHIPPED | OUTPATIENT
Start: 2023-07-05 | End: 2023-09-01

## 2023-07-05 RX ORDER — FLUOXETINE HYDROCHLORIDE 20 MG/1
20 CAPSULE ORAL DAILY
Qty: 30 CAPSULE | Refills: 2 | Status: SHIPPED | OUTPATIENT
Start: 2023-07-05

## 2023-09-19 ENCOUNTER — OFFICE VISIT (OUTPATIENT)
Age: 28
End: 2023-09-19

## 2023-09-19 VITALS
TEMPERATURE: 98.2 F | WEIGHT: 129 LBS | HEART RATE: 85 BPM | DIASTOLIC BLOOD PRESSURE: 79 MMHG | BODY MASS INDEX: 26 KG/M2 | SYSTOLIC BLOOD PRESSURE: 110 MMHG | RESPIRATION RATE: 16 BRPM | HEIGHT: 59 IN | OXYGEN SATURATION: 100 %

## 2023-09-19 DIAGNOSIS — F33.1 MAJOR DEPRESSIVE DISORDER, RECURRENT, MODERATE (HCC): ICD-10-CM

## 2023-09-19 DIAGNOSIS — F90.2 ATTENTION-DEFICIT HYPERACTIVITY DISORDER, COMBINED TYPE: ICD-10-CM

## 2023-09-19 PROCEDURE — 90792 PSYCH DIAG EVAL W/MED SRVCS: CPT | Performed by: NURSE PRACTITIONER

## 2023-09-19 RX ORDER — FLUOXETINE HYDROCHLORIDE 20 MG/1
20 CAPSULE ORAL DAILY
Qty: 30 CAPSULE | Refills: 2 | Status: SHIPPED | OUTPATIENT
Start: 2023-09-19

## 2023-09-19 RX ORDER — DEXTROAMPHETAMINE SACCHARATE, AMPHETAMINE ASPARTATE, DEXTROAMPHETAMINE SULFATE AND AMPHETAMINE SULFATE 2.5; 2.5; 2.5; 2.5 MG/1; MG/1; MG/1; MG/1
TABLET ORAL
Qty: 45 TABLET | Refills: 0 | Status: SHIPPED | OUTPATIENT
Start: 2023-09-19 | End: 2023-11-16

## 2023-09-19 ASSESSMENT — PATIENT HEALTH QUESTIONNAIRE - PHQ9
SUM OF ALL RESPONSES TO PHQ QUESTIONS 1-9: 11
SUM OF ALL RESPONSES TO PHQ QUESTIONS 1-9: 11
9. THOUGHTS THAT YOU WOULD BE BETTER OFF DEAD, OR OF HURTING YOURSELF: 0
1. LITTLE INTEREST OR PLEASURE IN DOING THINGS: 1
5. POOR APPETITE OR OVEREATING: 1
SUM OF ALL RESPONSES TO PHQ QUESTIONS 1-9: 11
6. FEELING BAD ABOUT YOURSELF - OR THAT YOU ARE A FAILURE OR HAVE LET YOURSELF OR YOUR FAMILY DOWN: 1
SUM OF ALL RESPONSES TO PHQ QUESTIONS 1-9: 11
SUM OF ALL RESPONSES TO PHQ9 QUESTIONS 1 & 2: 2
3. TROUBLE FALLING OR STAYING ASLEEP: 1
7. TROUBLE CONCENTRATING ON THINGS, SUCH AS READING THE NEWSPAPER OR WATCHING TELEVISION: 2
10. IF YOU CHECKED OFF ANY PROBLEMS, HOW DIFFICULT HAVE THESE PROBLEMS MADE IT FOR YOU TO DO YOUR WORK, TAKE CARE OF THINGS AT HOME, OR GET ALONG WITH OTHER PEOPLE: 1
8. MOVING OR SPEAKING SO SLOWLY THAT OTHER PEOPLE COULD HAVE NOTICED. OR THE OPPOSITE, BEING SO FIGETY OR RESTLESS THAT YOU HAVE BEEN MOVING AROUND A LOT MORE THAN USUAL: 1
4. FEELING TIRED OR HAVING LITTLE ENERGY: 3
2. FEELING DOWN, DEPRESSED OR HOPELESS: 1

## 2023-09-19 ASSESSMENT — ANXIETY QUESTIONNAIRES
6. BECOMING EASILY ANNOYED OR IRRITABLE: 2
4. TROUBLE RELAXING: 1
1. FEELING NERVOUS, ANXIOUS, OR ON EDGE: 1
GAD7 TOTAL SCORE: 7
7. FEELING AFRAID AS IF SOMETHING AWFUL MIGHT HAPPEN: 1
2. NOT BEING ABLE TO STOP OR CONTROL WORRYING: 0
IF YOU CHECKED OFF ANY PROBLEMS ON THIS QUESTIONNAIRE, HOW DIFFICULT HAVE THESE PROBLEMS MADE IT FOR YOU TO DO YOUR WORK, TAKE CARE OF THINGS AT HOME, OR GET ALONG WITH OTHER PEOPLE: SOMEWHAT DIFFICULT
5. BEING SO RESTLESS THAT IT IS HARD TO SIT STILL: 1
3. WORRYING TOO MUCH ABOUT DIFFERENT THINGS: 1

## 2023-09-19 NOTE — PROGRESS NOTES
Chief Complaint   Patient presents with    New Patient     Former Laura Falke pt         History of Present Ilness:   Garrett Vidal is a 33y.o.  year old female with a reported history of depression and ADHD who presents today to establish care. Patient's previous provider has left the practice. Patient reports that her symptoms have been present since childhood. Her symptoms are currently of low to moderate severity. Symptoms occur less frequently and are less severe with medications. Patient is currently prescribed Adderall 10 mg daily -1 tablet in AM and 1/2 tablet at noon and Fluoxetine 20 mg daily. Patient reports compliance with medications. Patient reports occasional headache with Adderall. No report of chest discomfort, dizziness, or palpitations. Patient feels this current regimen has benefited her symptoms. Her depression is generally chracterized by low mood, little interest in doing things, isolation, not wanting to talk with others, laying around, sleep disturbance, psychomotor slowing. Patient inattentiveness manifests as difficulty focusing, difficulty initiating projects/activities, making careless mistakes, poor attention, often easily distracted by extraneous stimuli. Patient is not currently in therapy. Patient denies history of hospitalization for mental illness. She reports infrequent use of alcohol. Denies use of THC or other substances. Patient currently denies any thoughts of self-harm, suicidal, or homicidal ideation. She denies symptoms of psychosis or flora. Past Psychiatric History:  Providers: Doreen Summers  Therapist:None  Diagnosis:Depression, ADHD  Medication:Prozac, Adderall  Hospitalization:Denies any hospitalizations  Denies any history of self-harm, suicide attempts, or violence.        Social History:  Born:[place]   Education:some college  Developmental Delays: reports being in special classes d/t being slow to comprehend things  Relationships:reports relationship

## 2023-12-15 ENCOUNTER — TELEPHONE (OUTPATIENT)
Age: 28
End: 2023-12-15

## 2023-12-15 NOTE — TELEPHONE ENCOUNTER
Patient wants a refill for following had appt for 12/15/23  had to reschedule due to work new appt is 1/5/24                      amphetamine-dextroamphetamine (ADDERALL) 10 MG tablet

## 2024-01-05 ENCOUNTER — OFFICE VISIT (OUTPATIENT)
Age: 29
End: 2024-01-05
Payer: COMMERCIAL

## 2024-01-05 VITALS
HEIGHT: 59 IN | TEMPERATURE: 98.2 F | RESPIRATION RATE: 16 BRPM | BODY MASS INDEX: 22.58 KG/M2 | DIASTOLIC BLOOD PRESSURE: 71 MMHG | WEIGHT: 112 LBS | HEART RATE: 79 BPM | SYSTOLIC BLOOD PRESSURE: 108 MMHG | OXYGEN SATURATION: 100 %

## 2024-01-05 DIAGNOSIS — F33.1 MAJOR DEPRESSIVE DISORDER, RECURRENT, MODERATE (HCC): ICD-10-CM

## 2024-01-05 DIAGNOSIS — F90.2 ATTENTION-DEFICIT HYPERACTIVITY DISORDER, COMBINED TYPE: ICD-10-CM

## 2024-01-05 PROCEDURE — 99214 OFFICE O/P EST MOD 30 MIN: CPT | Performed by: NURSE PRACTITIONER

## 2024-01-05 RX ORDER — FLUOXETINE HYDROCHLORIDE 20 MG/1
20 CAPSULE ORAL DAILY
Qty: 30 CAPSULE | Refills: 2 | Status: SHIPPED | OUTPATIENT
Start: 2024-01-05

## 2024-01-05 RX ORDER — DEXTROAMPHETAMINE SACCHARATE, AMPHETAMINE ASPARTATE, DEXTROAMPHETAMINE SULFATE AND AMPHETAMINE SULFATE 2.5; 2.5; 2.5; 2.5 MG/1; MG/1; MG/1; MG/1
TABLET ORAL
Qty: 45 TABLET | Refills: 0 | Status: SHIPPED | OUTPATIENT
Start: 2024-01-05 | End: 2024-02-28

## 2024-01-05 ASSESSMENT — PATIENT HEALTH QUESTIONNAIRE - PHQ9
9. THOUGHTS THAT YOU WOULD BE BETTER OFF DEAD, OR OF HURTING YOURSELF: 0
SUM OF ALL RESPONSES TO PHQ QUESTIONS 1-9: 10
10. IF YOU CHECKED OFF ANY PROBLEMS, HOW DIFFICULT HAVE THESE PROBLEMS MADE IT FOR YOU TO DO YOUR WORK, TAKE CARE OF THINGS AT HOME, OR GET ALONG WITH OTHER PEOPLE: 2
6. FEELING BAD ABOUT YOURSELF - OR THAT YOU ARE A FAILURE OR HAVE LET YOURSELF OR YOUR FAMILY DOWN: 1
3. TROUBLE FALLING OR STAYING ASLEEP: 2
SUM OF ALL RESPONSES TO PHQ QUESTIONS 1-9: 10
8. MOVING OR SPEAKING SO SLOWLY THAT OTHER PEOPLE COULD HAVE NOTICED. OR THE OPPOSITE, BEING SO FIGETY OR RESTLESS THAT YOU HAVE BEEN MOVING AROUND A LOT MORE THAN USUAL: 2
7. TROUBLE CONCENTRATING ON THINGS, SUCH AS READING THE NEWSPAPER OR WATCHING TELEVISION: 1
SUM OF ALL RESPONSES TO PHQ QUESTIONS 1-9: 10
SUM OF ALL RESPONSES TO PHQ QUESTIONS 1-9: 10
1. LITTLE INTEREST OR PLEASURE IN DOING THINGS: 1
2. FEELING DOWN, DEPRESSED OR HOPELESS: 1
5. POOR APPETITE OR OVEREATING: 1
4. FEELING TIRED OR HAVING LITTLE ENERGY: 1
SUM OF ALL RESPONSES TO PHQ9 QUESTIONS 1 & 2: 2

## 2024-01-05 ASSESSMENT — ANXIETY QUESTIONNAIRES
5. BEING SO RESTLESS THAT IT IS HARD TO SIT STILL: 1
7. FEELING AFRAID AS IF SOMETHING AWFUL MIGHT HAPPEN: 1
4. TROUBLE RELAXING: 1
6. BECOMING EASILY ANNOYED OR IRRITABLE: 2
GAD7 TOTAL SCORE: 9
1. FEELING NERVOUS, ANXIOUS, OR ON EDGE: 1
3. WORRYING TOO MUCH ABOUT DIFFERENT THINGS: 2
2. NOT BEING ABLE TO STOP OR CONTROL WORRYING: 1
IF YOU CHECKED OFF ANY PROBLEMS ON THIS QUESTIONNAIRE, HOW DIFFICULT HAVE THESE PROBLEMS MADE IT FOR YOU TO DO YOUR WORK, TAKE CARE OF THINGS AT HOME, OR GET ALONG WITH OTHER PEOPLE: SOMEWHAT DIFFICULT

## 2024-01-05 NOTE — PROGRESS NOTES
Chief Complaint   Patient presents with    Medication Check     Vitals:    01/05/24 1005   BP: 108/71   Pulse: 79   Resp: 16   Temp: 98.2 °F (36.8 °C)   SpO2: 100%     Prior to Admission medications    Medication Sig Start Date End Date Taking? Authorizing Provider   amphetamine-dextroamphetamine (ADDERALL) 10 MG tablet Take 1 whole tablet by mouth in the AM and 1/2 tablet by mouth at noon. 12/19/23 2/11/24 Yes Dayanna Smith APRN - NP   FLUoxetine (PROZAC) 20 MG capsule Take 1 capsule by mouth daily 9/19/23  Yes Dayanna Smith APRN - NP   albuterol sulfate HFA (PROAIR HFA) 108 (90 Base) MCG/ACT inhaler INHALE 2 PUFFS PO Q 4 TO 6 H PRN BREATHING 8/31/17  Yes Automatic Reconciliation, Ar     PHQ-9 Total Score: 10 (1/5/2024 10:04 AM)  Thoughts that you would be better off dead, or of hurting yourself in some way: 0 (1/5/2024 10:04 AM)        1/5/2024    10:05 AM 9/19/2023     1:06 PM   OTONIEL-7 SCREENING   Feeling nervous, anxious, or on edge Several days Several days   Not being able to stop or control worrying Several days Not at all   Worrying too much about different things More than half the days Several days   Trouble relaxing Several days Several days   Being so restless that it is hard to sit still Several days Several days   Becoming easily annoyed or irritable More than half the days More than half the days   Feeling afraid as if something awful might happen Several days Several days   OTONIEL-7 Total Score 9 7   How difficult have these problems made it for you to do your work, take care of things at home, or get along with other people? Somewhat difficult Somewhat difficult     1. Have you been to the ER, urgent care clinic since your last visit?  Hospitalized since your last visit?No    2. Have you seen or consulted any other health care providers outside of the [facility] since your last visit?  Include any pap smears or colon screening. No     
Good    SCALES:      1/5/2024    10:04 AM   PHQ-9    Little interest or pleasure in doing things 1   Feeling down, depressed, or hopeless 1   Trouble falling or staying asleep, or sleeping too much 2   Feeling tired or having little energy 1   Poor appetite or overeating 1   Feeling bad about yourself - or that you are a failure or have let yourself or your family down 1   Trouble concentrating on things, such as reading the newspaper or watching television 1   Moving or speaking so slowly that other people could have noticed. Or the opposite - being so fidgety or restless that you have been moving around a lot more than usual 2   Thoughts that you would be better off dead, or of hurting yourself in some way 0   PHQ-2 Score 2   PHQ-9 Total Score 10   If you checked off any problems, how difficult have these problems made it for you to do your work, take care of things at home, or get along with other people? 2              1/5/2024    10:05 AM 9/19/2023     1:06 PM   OTONIEL-7 SCREENING   Feeling nervous, anxious, or on edge Several days Several days   Not being able to stop or control worrying Several days Not at all   Worrying too much about different things More than half the days Several days   Trouble relaxing Several days Several days   Being so restless that it is hard to sit still Several days Several days   Becoming easily annoyed or irritable More than half the days More than half the days   Feeling afraid as if something awful might happen Several days Several days   OTONIEL-7 Total Score 9 7   How difficult have these problems made it for you to do your work, take care of things at home, or get along with other people? Somewhat difficult Somewhat difficult          MEDICAL DECISION MAKING:  Problems addressed today:    ICD-10-CM    1. Major depressive disorder, recurrent, moderate (HCC)  F33.1 FLUoxetine (PROZAC) 20 MG capsule     CBC     Comprehensive Metabolic Panel      2. Attention-deficit hyperactivity

## 2024-03-28 ENCOUNTER — OFFICE VISIT (OUTPATIENT)
Age: 29
End: 2024-03-28
Payer: COMMERCIAL

## 2024-03-28 VITALS
BODY MASS INDEX: 23.59 KG/M2 | RESPIRATION RATE: 16 BRPM | HEIGHT: 59 IN | HEART RATE: 85 BPM | OXYGEN SATURATION: 100 % | WEIGHT: 117 LBS | DIASTOLIC BLOOD PRESSURE: 73 MMHG | SYSTOLIC BLOOD PRESSURE: 121 MMHG | TEMPERATURE: 97.7 F

## 2024-03-28 DIAGNOSIS — F33.1 MAJOR DEPRESSIVE DISORDER, RECURRENT, MODERATE (HCC): ICD-10-CM

## 2024-03-28 DIAGNOSIS — F90.2 ATTENTION-DEFICIT HYPERACTIVITY DISORDER, COMBINED TYPE: ICD-10-CM

## 2024-03-28 PROCEDURE — 99214 OFFICE O/P EST MOD 30 MIN: CPT | Performed by: NURSE PRACTITIONER

## 2024-03-28 PROCEDURE — 90833 PSYTX W PT W E/M 30 MIN: CPT | Performed by: NURSE PRACTITIONER

## 2024-03-28 RX ORDER — FLUOXETINE HYDROCHLORIDE 20 MG/1
20 CAPSULE ORAL DAILY
Qty: 30 CAPSULE | Refills: 2 | Status: SHIPPED | OUTPATIENT
Start: 2024-03-28

## 2024-03-28 RX ORDER — DEXTROAMPHETAMINE SACCHARATE, AMPHETAMINE ASPARTATE, DEXTROAMPHETAMINE SULFATE AND AMPHETAMINE SULFATE 2.5; 2.5; 2.5; 2.5 MG/1; MG/1; MG/1; MG/1
TABLET ORAL
Qty: 45 TABLET | Refills: 0 | Status: SHIPPED | OUTPATIENT
Start: 2024-03-28 | End: 2024-05-21

## 2024-03-28 ASSESSMENT — ANXIETY QUESTIONNAIRES
GAD7 TOTAL SCORE: 9
3. WORRYING TOO MUCH ABOUT DIFFERENT THINGS: MORE THAN HALF THE DAYS
4. TROUBLE RELAXING: SEVERAL DAYS
2. NOT BEING ABLE TO STOP OR CONTROL WORRYING: SEVERAL DAYS
5. BEING SO RESTLESS THAT IT IS HARD TO SIT STILL: SEVERAL DAYS
1. FEELING NERVOUS, ANXIOUS, OR ON EDGE: SEVERAL DAYS
7. FEELING AFRAID AS IF SOMETHING AWFUL MIGHT HAPPEN: SEVERAL DAYS
6. BECOMING EASILY ANNOYED OR IRRITABLE: MORE THAN HALF THE DAYS
IF YOU CHECKED OFF ANY PROBLEMS ON THIS QUESTIONNAIRE, HOW DIFFICULT HAVE THESE PROBLEMS MADE IT FOR YOU TO DO YOUR WORK, TAKE CARE OF THINGS AT HOME, OR GET ALONG WITH OTHER PEOPLE: VERY DIFFICULT

## 2024-03-28 ASSESSMENT — PATIENT HEALTH QUESTIONNAIRE - PHQ9
6. FEELING BAD ABOUT YOURSELF - OR THAT YOU ARE A FAILURE OR HAVE LET YOURSELF OR YOUR FAMILY DOWN: SEVERAL DAYS
SUM OF ALL RESPONSES TO PHQ QUESTIONS 1-9: 12
5. POOR APPETITE OR OVEREATING: MORE THAN HALF THE DAYS
1. LITTLE INTEREST OR PLEASURE IN DOING THINGS: SEVERAL DAYS
SUM OF ALL RESPONSES TO PHQ QUESTIONS 1-9: 12
7. TROUBLE CONCENTRATING ON THINGS, SUCH AS READING THE NEWSPAPER OR WATCHING TELEVISION: MORE THAN HALF THE DAYS
4. FEELING TIRED OR HAVING LITTLE ENERGY: MORE THAN HALF THE DAYS
9. THOUGHTS THAT YOU WOULD BE BETTER OFF DEAD, OR OF HURTING YOURSELF: NOT AT ALL
3. TROUBLE FALLING OR STAYING ASLEEP: SEVERAL DAYS
10. IF YOU CHECKED OFF ANY PROBLEMS, HOW DIFFICULT HAVE THESE PROBLEMS MADE IT FOR YOU TO DO YOUR WORK, TAKE CARE OF THINGS AT HOME, OR GET ALONG WITH OTHER PEOPLE: VERY DIFFICULT
SUM OF ALL RESPONSES TO PHQ9 QUESTIONS 1 & 2: 2
8. MOVING OR SPEAKING SO SLOWLY THAT OTHER PEOPLE COULD HAVE NOTICED. OR THE OPPOSITE, BEING SO FIGETY OR RESTLESS THAT YOU HAVE BEEN MOVING AROUND A LOT MORE THAN USUAL: MORE THAN HALF THE DAYS
SUM OF ALL RESPONSES TO PHQ QUESTIONS 1-9: 12
SUM OF ALL RESPONSES TO PHQ QUESTIONS 1-9: 12
2. FEELING DOWN, DEPRESSED OR HOPELESS: SEVERAL DAYS

## 2024-03-28 NOTE — PROGRESS NOTES
CHIEF COMPLAINT:  Adalgisa Monzon is a 28 y.o. female and was seen today for follow-up of psychiatric condition and psychotropic medication management.     HPI:    Adalgisa  reports the following psychiatric symptoms by hx:  depression, anxiety, and inattentiveness.  Overall symptoms have been present for years. Currently symptoms are of low severity. The symptoms occur less frequently and are less severe. Patient identifies family stress. She feels her depression and anxiety are somewhat improved. She rates her depression at 3-4/10 with 10 being the worst. Anxiety is rated at 7/10 with 10 being the worst. Energy level is somewhat low. Concentrate and ability to stay on task is good. She reports compliance with medications. Denies side effects. No complaint of chest pain, dizziness, palpitations, headaches. Patient Appetite:decreased. Sleep: varies between days of getting 4-6 hours to days of getting 7-8 hours. Patient denies symptoms of psychosis or flora. Denies suicidal or homicidal ideation.     Current Outpatient Medications on File Prior to Visit   Medication Sig Dispense Refill    albuterol sulfate HFA (PROAIR HFA) 108 (90 Base) MCG/ACT inhaler INHALE 2 PUFFS PO Q 4 TO 6 H PRN BREATHING       No current facility-administered medications on file prior to visit.        Past Medical History:   Diagnosis Date    Asthma         Family History   Problem Relation Age of Onset    Heart Failure Mother           Social History     Socioeconomic History    Marital status:      Spouse name: Not on file    Number of children: Not on file    Years of education: Not on file    Highest education level: Not on file   Occupational History    Not on file   Tobacco Use    Smoking status: Some Days    Smokeless tobacco: Never   Substance and Sexual Activity    Alcohol use: Yes    Drug use: No    Sexual activity: Not on file   Other Topics Concern    Not on file   Social History Narrative    Not on file     Social

## 2024-03-28 NOTE — PROGRESS NOTES
Chief Complaint   Patient presents with    Medication Check      Vitals:    03/28/24 0958   BP: 121/73   Pulse: 85   Resp: 16   Temp: 97.7 °F (36.5 °C)   SpO2: 100%      Prior to Admission medications    Medication Sig Start Date End Date Taking? Authorizing Provider   FLUoxetine (PROZAC) 20 MG capsule Take 1 capsule by mouth daily 1/5/24  Yes Dayanna Smith APRN - NP   albuterol sulfate HFA (PROAIR HFA) 108 (90 Base) MCG/ACT inhaler INHALE 2 PUFFS PO Q 4 TO 6 H PRN BREATHING 8/31/17  Yes Automatic Reconciliation, Ar   amphetamine-dextroamphetamine (ADDERALL) 10 MG tablet Take 1 whole tablet by mouth in the AM and 1/2 tablet by mouth at noon. 1/5/24 2/28/24  Dayanna Smith APRN - NP      PHQ-9 Total Score: 12 (3/28/2024  9:56 AM)  Thoughts that you would be better off dead, or of hurting yourself in some way: 0 (3/28/2024  9:56 AM)         3/28/2024     9:58 AM 1/5/2024    10:05 AM 9/19/2023     1:06 PM   OTONIEL-7 SCREENING   Feeling nervous, anxious, or on edge Several days Several days Several days   Not being able to stop or control worrying Several days Several days Not at all   Worrying too much about different things More than half the days More than half the days Several days   Trouble relaxing Several days Several days Several days   Being so restless that it is hard to sit still Several days Several days Several days   Becoming easily annoyed or irritable More than half the days More than half the days More than half the days   Feeling afraid as if something awful might happen Several days Several days Several days   OTONIEL-7 Total Score 9 9 7   How difficult have these problems made it for you to do your work, take care of things at home, or get along with other people? Very difficult Somewhat difficult Somewhat difficult     1. Have you been to the ER, urgent care clinic since your last visit?  Hospitalized since your last visit?No    2. Have you seen or consulted any other health care providers

## 2024-05-06 DIAGNOSIS — F90.2 ATTENTION-DEFICIT HYPERACTIVITY DISORDER, COMBINED TYPE: ICD-10-CM

## 2024-05-06 RX ORDER — DEXTROAMPHETAMINE SACCHARATE, AMPHETAMINE ASPARTATE, DEXTROAMPHETAMINE SULFATE AND AMPHETAMINE SULFATE 2.5; 2.5; 2.5; 2.5 MG/1; MG/1; MG/1; MG/1
TABLET ORAL
Qty: 45 TABLET | Refills: 0 | Status: SHIPPED | OUTPATIENT
Start: 2024-05-06 | End: 2024-06-29

## 2024-05-29 ENCOUNTER — OFFICE VISIT (OUTPATIENT)
Age: 29
End: 2024-05-29
Payer: COMMERCIAL

## 2024-05-29 VITALS
HEART RATE: 80 BPM | DIASTOLIC BLOOD PRESSURE: 74 MMHG | SYSTOLIC BLOOD PRESSURE: 101 MMHG | RESPIRATION RATE: 16 BRPM | WEIGHT: 119 LBS | HEIGHT: 59 IN | TEMPERATURE: 97.7 F | OXYGEN SATURATION: 100 % | BODY MASS INDEX: 23.99 KG/M2

## 2024-05-29 DIAGNOSIS — F33.1 DEPRESSION, MAJOR, RECURRENT, MODERATE (HCC): Primary | ICD-10-CM

## 2024-05-29 DIAGNOSIS — F41.9 ANXIETY: ICD-10-CM

## 2024-05-29 PROCEDURE — 99214 OFFICE O/P EST MOD 30 MIN: CPT | Performed by: NURSE PRACTITIONER

## 2024-05-29 RX ORDER — ESCITALOPRAM OXALATE 5 MG/1
5 TABLET ORAL DAILY
Qty: 90 TABLET | Refills: 0 | Status: SHIPPED | OUTPATIENT
Start: 2024-05-29

## 2024-05-29 ASSESSMENT — ANXIETY QUESTIONNAIRES
2. NOT BEING ABLE TO STOP OR CONTROL WORRYING: SEVERAL DAYS
3. WORRYING TOO MUCH ABOUT DIFFERENT THINGS: MORE THAN HALF THE DAYS
5. BEING SO RESTLESS THAT IT IS HARD TO SIT STILL: MORE THAN HALF THE DAYS
GAD7 TOTAL SCORE: 11
4. TROUBLE RELAXING: SEVERAL DAYS
1. FEELING NERVOUS, ANXIOUS, OR ON EDGE: MORE THAN HALF THE DAYS
7. FEELING AFRAID AS IF SOMETHING AWFUL MIGHT HAPPEN: NOT AT ALL
IF YOU CHECKED OFF ANY PROBLEMS ON THIS QUESTIONNAIRE, HOW DIFFICULT HAVE THESE PROBLEMS MADE IT FOR YOU TO DO YOUR WORK, TAKE CARE OF THINGS AT HOME, OR GET ALONG WITH OTHER PEOPLE: VERY DIFFICULT
6. BECOMING EASILY ANNOYED OR IRRITABLE: NEARLY EVERY DAY

## 2024-05-29 ASSESSMENT — PATIENT HEALTH QUESTIONNAIRE - PHQ9
7. TROUBLE CONCENTRATING ON THINGS, SUCH AS READING THE NEWSPAPER OR WATCHING TELEVISION: MORE THAN HALF THE DAYS
10. IF YOU CHECKED OFF ANY PROBLEMS, HOW DIFFICULT HAVE THESE PROBLEMS MADE IT FOR YOU TO DO YOUR WORK, TAKE CARE OF THINGS AT HOME, OR GET ALONG WITH OTHER PEOPLE: VERY DIFFICULT
5. POOR APPETITE OR OVEREATING: SEVERAL DAYS
SUM OF ALL RESPONSES TO PHQ QUESTIONS 1-9: 13
4. FEELING TIRED OR HAVING LITTLE ENERGY: NEARLY EVERY DAY
SUM OF ALL RESPONSES TO PHQ QUESTIONS 1-9: 13
2. FEELING DOWN, DEPRESSED OR HOPELESS: MORE THAN HALF THE DAYS
6. FEELING BAD ABOUT YOURSELF - OR THAT YOU ARE A FAILURE OR HAVE LET YOURSELF OR YOUR FAMILY DOWN: SEVERAL DAYS
SUM OF ALL RESPONSES TO PHQ QUESTIONS 1-9: 13
9. THOUGHTS THAT YOU WOULD BE BETTER OFF DEAD, OR OF HURTING YOURSELF: NOT AT ALL
1. LITTLE INTEREST OR PLEASURE IN DOING THINGS: MORE THAN HALF THE DAYS
SUM OF ALL RESPONSES TO PHQ QUESTIONS 1-9: 13
SUM OF ALL RESPONSES TO PHQ9 QUESTIONS 1 & 2: 4
3. TROUBLE FALLING OR STAYING ASLEEP: SEVERAL DAYS
8. MOVING OR SPEAKING SO SLOWLY THAT OTHER PEOPLE COULD HAVE NOTICED. OR THE OPPOSITE, BEING SO FIGETY OR RESTLESS THAT YOU HAVE BEEN MOVING AROUND A LOT MORE THAN USUAL: SEVERAL DAYS

## 2024-05-29 NOTE — PROGRESS NOTES
Chief Complaint   Patient presents with    Follow-up      Vitals:    05/29/24 1408   BP: 101/74   Pulse: 80   Resp: 16   Temp: 97.7 °F (36.5 °C)   SpO2: 100%      Prior to Admission medications    Medication Sig Start Date End Date Taking? Authorizing Provider   Prenatal Vit-Fe Fumarate-FA (PRENATAL VITAMIN PO) Take by mouth daily   Yes Provider, MD Sandy   amphetamine-dextroamphetamine (ADDERALL) 10 MG tablet Take 1 whole tablet by mouth in the AM and 1/2 tablet by mouth at noon. 5/6/24 6/29/24 Yes Dayanna Smith APRN - NP   FLUoxetine (PROZAC) 20 MG capsule Take 1 capsule by mouth daily 3/28/24  Yes Dayanna Smith APRN - NP   albuterol sulfate HFA (PROAIR HFA) 108 (90 Base) MCG/ACT inhaler INHALE 2 PUFFS PO Q 4 TO 6 H PRN BREATHING 8/31/17  Yes Automatic Reconciliation, Ar      PHQ-9 Total Score: 13 (5/29/2024  2:07 PM)  Thoughts that you would be better off dead, or of hurting yourself in some way: 0 (5/29/2024  2:07 PM)           5/29/2024     2:08 PM 3/28/2024     9:58 AM 1/5/2024    10:05 AM   OTONIEL-7 SCREENING   Feeling nervous, anxious, or on edge More than half the days Several days Several days   Not being able to stop or control worrying Several days Several days Several days   Worrying too much about different things More than half the days More than half the days More than half the days   Trouble relaxing Several days Several days Several days   Being so restless that it is hard to sit still More than half the days Several days Several days   Becoming easily annoyed or irritable Nearly every day More than half the days More than half the days   Feeling afraid as if something awful might happen Not at all Several days Several days   OTONIEL-7 Total Score 11 9 9   How difficult have these problems made it for you to do your work, take care of things at home, or get along with other people? Very difficult Very difficult Somewhat difficult        \"Have you been to the ER, urgent care clinic 
Rfl:     2.  Counseling and coordination of care including instructions for treatment, risks/benefits, risk factor reduction and patient/family education. She agrees with the plan. Patient instructed to call with any side effects, questions or issues.   3.PSYCHOTHERAPY: Patient was provided with supportive therapy, strongly encourage to seek psychotherapy.    4. MEDICAL CARE: Patient was strongly encourage to take their medical medications and follow up with their PCP on regular basis.    5. SUBSTANCE ABUSE CARE: Patient denies a smoking, drinking, abusing any illicit drugs.  6. Patient was informed that in case of emergency to go to the nearest ER or call 911.     Patient was given time to ask questions and voice concerns. I believe all questions concerns were adequately addressed at this office visit. Patient verbalized agreement and understanding of the above-stated plan.    Follow-up and Dispositions    Return in about 4 weeks (around 6/26/2024).       5/29/2024  DELANO Jamison - NP

## 2024-06-27 ENCOUNTER — OFFICE VISIT (OUTPATIENT)
Age: 29
End: 2024-06-27
Payer: COMMERCIAL

## 2024-06-27 VITALS
WEIGHT: 119 LBS | OXYGEN SATURATION: 100 % | RESPIRATION RATE: 16 BRPM | TEMPERATURE: 97.7 F | SYSTOLIC BLOOD PRESSURE: 108 MMHG | BODY MASS INDEX: 23.99 KG/M2 | HEART RATE: 83 BPM | HEIGHT: 59 IN | DIASTOLIC BLOOD PRESSURE: 73 MMHG

## 2024-06-27 DIAGNOSIS — F41.9 ANXIETY: ICD-10-CM

## 2024-06-27 DIAGNOSIS — F33.1 DEPRESSION, MAJOR, RECURRENT, MODERATE (HCC): Primary | ICD-10-CM

## 2024-06-27 PROCEDURE — 99214 OFFICE O/P EST MOD 30 MIN: CPT | Performed by: NURSE PRACTITIONER

## 2024-06-27 ASSESSMENT — PATIENT HEALTH QUESTIONNAIRE - PHQ9
8. MOVING OR SPEAKING SO SLOWLY THAT OTHER PEOPLE COULD HAVE NOTICED. OR THE OPPOSITE, BEING SO FIGETY OR RESTLESS THAT YOU HAVE BEEN MOVING AROUND A LOT MORE THAN USUAL: SEVERAL DAYS
7. TROUBLE CONCENTRATING ON THINGS, SUCH AS READING THE NEWSPAPER OR WATCHING TELEVISION: SEVERAL DAYS
SUM OF ALL RESPONSES TO PHQ9 QUESTIONS 1 & 2: 4
3. TROUBLE FALLING OR STAYING ASLEEP: MORE THAN HALF THE DAYS
SUM OF ALL RESPONSES TO PHQ QUESTIONS 1-9: 13
4. FEELING TIRED OR HAVING LITTLE ENERGY: MORE THAN HALF THE DAYS
SUM OF ALL RESPONSES TO PHQ QUESTIONS 1-9: 13
SUM OF ALL RESPONSES TO PHQ QUESTIONS 1-9: 13
9. THOUGHTS THAT YOU WOULD BE BETTER OFF DEAD, OR OF HURTING YOURSELF: NOT AT ALL
5. POOR APPETITE OR OVEREATING: SEVERAL DAYS
2. FEELING DOWN, DEPRESSED OR HOPELESS: MORE THAN HALF THE DAYS
10. IF YOU CHECKED OFF ANY PROBLEMS, HOW DIFFICULT HAVE THESE PROBLEMS MADE IT FOR YOU TO DO YOUR WORK, TAKE CARE OF THINGS AT HOME, OR GET ALONG WITH OTHER PEOPLE: VERY DIFFICULT
6. FEELING BAD ABOUT YOURSELF - OR THAT YOU ARE A FAILURE OR HAVE LET YOURSELF OR YOUR FAMILY DOWN: MORE THAN HALF THE DAYS
SUM OF ALL RESPONSES TO PHQ QUESTIONS 1-9: 13

## 2024-06-27 ASSESSMENT — ANXIETY QUESTIONNAIRES
IF YOU CHECKED OFF ANY PROBLEMS ON THIS QUESTIONNAIRE, HOW DIFFICULT HAVE THESE PROBLEMS MADE IT FOR YOU TO DO YOUR WORK, TAKE CARE OF THINGS AT HOME, OR GET ALONG WITH OTHER PEOPLE: VERY DIFFICULT
6. BECOMING EASILY ANNOYED OR IRRITABLE: MORE THAN HALF THE DAYS
GAD7 TOTAL SCORE: 12
7. FEELING AFRAID AS IF SOMETHING AWFUL MIGHT HAPPEN: MORE THAN HALF THE DAYS
4. TROUBLE RELAXING: SEVERAL DAYS
2. NOT BEING ABLE TO STOP OR CONTROL WORRYING: MORE THAN HALF THE DAYS
5. BEING SO RESTLESS THAT IT IS HARD TO SIT STILL: SEVERAL DAYS
3. WORRYING TOO MUCH ABOUT DIFFERENT THINGS: MORE THAN HALF THE DAYS
1. FEELING NERVOUS, ANXIOUS, OR ON EDGE: MORE THAN HALF THE DAYS

## 2024-06-27 NOTE — PROGRESS NOTES
Chief Complaint   Patient presents with    Medication Check      Vitals:    06/27/24 0959   BP: 108/73   Pulse: 83   Resp: 16   Temp: 97.7 °F (36.5 °C)   SpO2: 100%      Prior to Admission medications    Medication Sig Start Date End Date Taking? Authorizing Provider   Prenatal Vit-Fe Fumarate-FA (PRENATAL VITAMIN PO) Take by mouth daily   Yes Provider, MD Sandy   escitalopram (LEXAPRO) 5 MG tablet Take 1 tablet by mouth daily 5/29/24  Yes Dayanna Smith APRN - NP   albuterol sulfate HFA (PROAIR HFA) 108 (90 Base) MCG/ACT inhaler INHALE 2 PUFFS PO Q 4 TO 6 H PRN BREATHING 8/31/17  Yes Automatic Reconciliation, Ar      PHQ-9 Total Score: 13 (6/27/2024  9:57 AM)  Thoughts that you would be better off dead, or of hurting yourself in some way: 0 (6/27/2024  9:57 AM)         6/27/2024     9:57 AM 5/29/2024     2:07 PM 3/28/2024     9:56 AM   PHQ-9    Little interest or pleasure in doing things 2 2 1   Feeling down, depressed, or hopeless 2 2 1   Trouble falling or staying asleep, or sleeping too much 2 1 1   Feeling tired or having little energy 2 3 2   Poor appetite or overeating 1 1 2   Feeling bad about yourself - or that you are a failure or have let yourself or your family down 2 1 1   Trouble concentrating on things, such as reading the newspaper or watching television 1 2 2   Moving or speaking so slowly that other people could have noticed. Or the opposite - being so fidgety or restless that you have been moving around a lot more than usual 1 1 2   Thoughts that you would be better off dead, or of hurting yourself in some way 0 0 0   PHQ-2 Score 4 4 2   PHQ-9 Total Score 13 13 12   If you checked off any problems, how difficult have these problems made it for you to do your work, take care of things at home, or get along with other people? 2 2 2           6/27/2024     9:58 AM 5/29/2024     2:08 PM 3/28/2024     9:58 AM   OTONIEL-7 SCREENING   Feeling nervous, anxious, or on edge More than half the days 
Counseling and coordination of care including instructions for treatment, risks/benefits, risk factor reduction and patient/family education. She agrees with the plan. Patient instructed to call with any side effects, questions or issues.   3.PSYCHOTHERAPY: Patient was provided with supportive therapy, strongly encourage to seek psychotherapy.    4. MEDICAL CARE: Patient was strongly encourage to take their medical medications and follow up with their PCP on regular basis.    5. SUBSTANCE ABUSE CARE: Patient denies a smoking, drinking, abusing any illicit drugs.  6. Patient was informed that in case of emergency to go to the nearest ER or call 911.     Patient was given time to ask questions and voice concerns. I believe all questions concerns were adequately addressed at this office visit. Patient verbalized agreement and understanding of the above-stated plan.    Follow-up and Dispositions    Return in about 4 weeks (around 7/25/2024).       6/27/2024  DELANO Jamison NP

## 2024-07-24 ENCOUNTER — OFFICE VISIT (OUTPATIENT)
Age: 29
End: 2024-07-24
Payer: COMMERCIAL

## 2024-07-24 VITALS
TEMPERATURE: 97.7 F | SYSTOLIC BLOOD PRESSURE: 110 MMHG | HEIGHT: 59 IN | RESPIRATION RATE: 16 BRPM | HEART RATE: 77 BPM | OXYGEN SATURATION: 100 % | WEIGHT: 119 LBS | BODY MASS INDEX: 23.99 KG/M2 | DIASTOLIC BLOOD PRESSURE: 69 MMHG

## 2024-07-24 DIAGNOSIS — F33.1 DEPRESSION, MAJOR, RECURRENT, MODERATE (HCC): Primary | ICD-10-CM

## 2024-07-24 DIAGNOSIS — F41.9 ANXIETY: ICD-10-CM

## 2024-07-24 PROCEDURE — 99214 OFFICE O/P EST MOD 30 MIN: CPT | Performed by: NURSE PRACTITIONER

## 2024-07-24 NOTE — PROGRESS NOTES
Chief Complaint   Patient presents with    Medication Check      Vitals:    07/24/24 1339   BP: 110/69   Pulse: 77   Resp: 16   Temp: 97.7 °F (36.5 °C)   SpO2: 100%      Prior to Admission medications    Medication Sig Start Date End Date Taking? Authorizing Provider   Prenatal Vit-Fe Fumarate-FA (PRENATAL VITAMIN PO) Take by mouth daily   Yes Provider, MD Sandy   escitalopram (LEXAPRO) 5 MG tablet Take 1 tablet by mouth daily 5/29/24  Yes Dayanna Smith APRN - NP   albuterol sulfate HFA (PROAIR HFA) 108 (90 Base) MCG/ACT inhaler INHALE 2 PUFFS PO Q 4 TO 6 H PRN BREATHING 8/31/17  Yes Automatic Reconciliation, Ar      PHQ-9 Total Score: 14 (7/24/2024  1:37 PM)  Thoughts that you would be better off dead, or of hurting yourself in some way: 0 (7/24/2024  1:37 PM)         7/24/2024     1:37 PM 6/27/2024     9:57 AM 5/29/2024     2:07 PM   PHQ-9    Little interest or pleasure in doing things 2 2 2   Feeling down, depressed, or hopeless 2 2 2   Trouble falling or staying asleep, or sleeping too much 2 2 1   Feeling tired or having little energy 3 2 3   Poor appetite or overeating 1 1 1   Feeling bad about yourself - or that you are a failure or have let yourself or your family down 1 2 1   Trouble concentrating on things, such as reading the newspaper or watching television 2 1 2   Moving or speaking so slowly that other people could have noticed. Or the opposite - being so fidgety or restless that you have been moving around a lot more than usual 1 1 1   Thoughts that you would be better off dead, or of hurting yourself in some way 0 0 0   PHQ-2 Score 4 4 4   PHQ-9 Total Score 14 13 13   If you checked off any problems, how difficult have these problems made it for you to do your work, take care of things at home, or get along with other people? 2 2 2           7/24/2024     1:38 PM 6/27/2024     9:58 AM 5/29/2024     2:08 PM   OTONIEL-7 SCREENING   Feeling nervous, anxious, or on edge More than half the days 
Patient was strongly encourage to take their medical medications and follow up with their PCP on regular basis.    5. SUBSTANCE ABUSE CARE: Patient denies a smoking, drinking, abusing any illicit drugs.  6. Patient was informed that in case of emergency to go to the nearest ER or call 911.     Patient was given time to ask questions and voice concerns. I believe all questions concerns were adequately addressed at this office visit. Patient verbalized agreement and understanding of the above-stated plan.    Follow-up and Dispositions    Return in about 6 weeks (around 9/4/2024).         7/24/2024  DELANO Jamison - NP

## 2024-09-04 ENCOUNTER — OFFICE VISIT (OUTPATIENT)
Age: 29
End: 2024-09-04
Payer: COMMERCIAL

## 2024-09-04 VITALS
OXYGEN SATURATION: 100 % | HEIGHT: 59 IN | WEIGHT: 127 LBS | RESPIRATION RATE: 16 BRPM | SYSTOLIC BLOOD PRESSURE: 108 MMHG | TEMPERATURE: 98.3 F | HEART RATE: 78 BPM | BODY MASS INDEX: 25.6 KG/M2 | DIASTOLIC BLOOD PRESSURE: 67 MMHG

## 2024-09-04 DIAGNOSIS — F33.1 DEPRESSION, MAJOR, RECURRENT, MODERATE (HCC): ICD-10-CM

## 2024-09-04 DIAGNOSIS — F41.9 ANXIETY: ICD-10-CM

## 2024-09-04 PROCEDURE — 99214 OFFICE O/P EST MOD 30 MIN: CPT | Performed by: NURSE PRACTITIONER

## 2024-09-04 RX ORDER — ESCITALOPRAM OXALATE 5 MG/1
5 TABLET ORAL DAILY
Qty: 90 TABLET | Refills: 0 | Status: SHIPPED | OUTPATIENT
Start: 2024-09-04

## 2024-09-04 ASSESSMENT — PATIENT HEALTH QUESTIONNAIRE - PHQ9
8. MOVING OR SPEAKING SO SLOWLY THAT OTHER PEOPLE COULD HAVE NOTICED. OR THE OPPOSITE, BEING SO FIGETY OR RESTLESS THAT YOU HAVE BEEN MOVING AROUND A LOT MORE THAN USUAL: SEVERAL DAYS
SUM OF ALL RESPONSES TO PHQ QUESTIONS 1-9: 13
1. LITTLE INTEREST OR PLEASURE IN DOING THINGS: MORE THAN HALF THE DAYS
3. TROUBLE FALLING OR STAYING ASLEEP: SEVERAL DAYS
SUM OF ALL RESPONSES TO PHQ9 QUESTIONS 1 & 2: 4
SUM OF ALL RESPONSES TO PHQ QUESTIONS 1-9: 13
7. TROUBLE CONCENTRATING ON THINGS, SUCH AS READING THE NEWSPAPER OR WATCHING TELEVISION: MORE THAN HALF THE DAYS
5. POOR APPETITE OR OVEREATING: SEVERAL DAYS
10. IF YOU CHECKED OFF ANY PROBLEMS, HOW DIFFICULT HAVE THESE PROBLEMS MADE IT FOR YOU TO DO YOUR WORK, TAKE CARE OF THINGS AT HOME, OR GET ALONG WITH OTHER PEOPLE: VERY DIFFICULT
SUM OF ALL RESPONSES TO PHQ QUESTIONS 1-9: 13
9. THOUGHTS THAT YOU WOULD BE BETTER OFF DEAD, OR OF HURTING YOURSELF: NOT AT ALL
4. FEELING TIRED OR HAVING LITTLE ENERGY: NEARLY EVERY DAY
6. FEELING BAD ABOUT YOURSELF - OR THAT YOU ARE A FAILURE OR HAVE LET YOURSELF OR YOUR FAMILY DOWN: SEVERAL DAYS
SUM OF ALL RESPONSES TO PHQ QUESTIONS 1-9: 13
2. FEELING DOWN, DEPRESSED OR HOPELESS: MORE THAN HALF THE DAYS

## 2024-09-04 ASSESSMENT — ANXIETY QUESTIONNAIRES
6. BECOMING EASILY ANNOYED OR IRRITABLE: MORE THAN HALF THE DAYS
3. WORRYING TOO MUCH ABOUT DIFFERENT THINGS: NEARLY EVERY DAY
IF YOU CHECKED OFF ANY PROBLEMS ON THIS QUESTIONNAIRE, HOW DIFFICULT HAVE THESE PROBLEMS MADE IT FOR YOU TO DO YOUR WORK, TAKE CARE OF THINGS AT HOME, OR GET ALONG WITH OTHER PEOPLE: SOMEWHAT DIFFICULT
7. FEELING AFRAID AS IF SOMETHING AWFUL MIGHT HAPPEN: MORE THAN HALF THE DAYS
GAD7 TOTAL SCORE: 14
2. NOT BEING ABLE TO STOP OR CONTROL WORRYING: NEARLY EVERY DAY
1. FEELING NERVOUS, ANXIOUS, OR ON EDGE: MORE THAN HALF THE DAYS
4. TROUBLE RELAXING: SEVERAL DAYS
5. BEING SO RESTLESS THAT IT IS HARD TO SIT STILL: SEVERAL DAYS

## 2024-09-04 NOTE — PROGRESS NOTES
Chief Complaint   Patient presents with    Medication Check      Vitals:    09/04/24 1356   BP: 108/67   Pulse: 78   Resp: 16   Temp: 98.3 °F (36.8 °C)   SpO2: 100%      Prior to Admission medications    Medication Sig Start Date End Date Taking? Authorizing Provider   Prenatal Vit-Fe Fumarate-FA (PRENATAL VITAMIN PO) Take by mouth daily   Yes Provider, MD Sandy   escitalopram (LEXAPRO) 5 MG tablet Take 1 tablet by mouth daily 5/29/24  Yes Dayanna Smith APRN - NP   albuterol sulfate HFA (PROAIR HFA) 108 (90 Base) MCG/ACT inhaler INHALE 2 PUFFS PO Q 4 TO 6 H PRN BREATHING 8/31/17  Yes Automatic Reconciliation, Ar      PHQ-9 Total Score: 13 (9/4/2024  1:57 PM)  Thoughts that you would be better off dead, or of hurting yourself in some way: 0 (9/4/2024  1:57 PM)         9/4/2024     1:57 PM 7/24/2024     1:37 PM 6/27/2024     9:57 AM   PHQ-9    Little interest or pleasure in doing things 2 2 2   Feeling down, depressed, or hopeless 2 2 2   Trouble falling or staying asleep, or sleeping too much 1 2 2   Feeling tired or having little energy 3 3 2   Poor appetite or overeating 1 1 1   Feeling bad about yourself - or that you are a failure or have let yourself or your family down 1 1 2   Trouble concentrating on things, such as reading the newspaper or watching television 2 2 1   Moving or speaking so slowly that other people could have noticed. Or the opposite - being so fidgety or restless that you have been moving around a lot more than usual 1 1 1   Thoughts that you would be better off dead, or of hurting yourself in some way 0 0 0   PHQ-2 Score 4 4 4   PHQ-9 Total Score 13 14 13   If you checked off any problems, how difficult have these problems made it for you to do your work, take care of things at home, or get along with other people? 2 2 2           9/4/2024     1:59 PM 7/24/2024     1:38 PM 6/27/2024     9:58 AM   OTONIEL-7 SCREENING   Feeling nervous, anxious, or on edge More than half the days

## 2024-09-04 NOTE — PROGRESS NOTES
CHIEF COMPLAINT:  Adalgisa Monzon is a 29 y.o. female and was seen today for follow-up of psychiatric condition and psychotropic medication management.     HPI:    Adalgisa reports the following psychiatric symptoms by hx:  depression, anxiety, and inattentiveness.  Overall symptoms have been present for years. Currently symptoms are of moderate severity. The symptoms occur intermittently and generally are exacerbated by stress. Patient identifies life stress. Her depression and anxiety is about the same. She reports that she has not seen much improvement in her energy level. Patient reports that her pregnancy is going well, no complications to report. She has been following up with her Obgyn and baby is due to be here on 1/5/2024.       Her depression and anxiety are about the same. She reports compliance with medications. Denies any side effects. She states that she will consider taking  a higher dose should her symptoms worsen. She feels foe now she is able to pull through. Patient appetite and sleep fluctuates. Patient denies symptoms of psychosis or flora. Denies suicidal or homicidal ideation.       Current Outpatient Medications on File Prior to Visit   Medication Sig Dispense Refill    Prenatal Vit-Fe Fumarate-FA (PRENATAL VITAMIN PO) Take by mouth daily      albuterol sulfate HFA (PROAIR HFA) 108 (90 Base) MCG/ACT inhaler INHALE 2 PUFFS PO Q 4 TO 6 H PRN BREATHING       No current facility-administered medications on file prior to visit.        Past Medical History:   Diagnosis Date    Asthma         Family History   Problem Relation Age of Onset    Heart Failure Mother           Social History     Socioeconomic History    Marital status:      Spouse name: Not on file    Number of children: Not on file    Years of education: Not on file    Highest education level: Not on file   Occupational History    Not on file   Tobacco Use    Smoking status: Some Days    Smokeless tobacco: Never   Substance and

## 2024-11-06 ENCOUNTER — OFFICE VISIT (OUTPATIENT)
Age: 29
End: 2024-11-06
Payer: COMMERCIAL

## 2024-11-06 VITALS
HEIGHT: 59 IN | DIASTOLIC BLOOD PRESSURE: 70 MMHG | TEMPERATURE: 98.3 F | HEART RATE: 76 BPM | OXYGEN SATURATION: 100 % | BODY MASS INDEX: 27.01 KG/M2 | WEIGHT: 134 LBS | SYSTOLIC BLOOD PRESSURE: 110 MMHG | RESPIRATION RATE: 16 BRPM

## 2024-11-06 DIAGNOSIS — F33.1 DEPRESSION, MAJOR, RECURRENT, MODERATE (HCC): ICD-10-CM

## 2024-11-06 DIAGNOSIS — F41.9 ANXIETY: ICD-10-CM

## 2024-11-06 PROCEDURE — 99214 OFFICE O/P EST MOD 30 MIN: CPT | Performed by: NURSE PRACTITIONER

## 2024-11-06 RX ORDER — ESCITALOPRAM OXALATE 5 MG/1
5 TABLET ORAL DAILY
Qty: 90 TABLET | Refills: 0 | Status: SHIPPED | OUTPATIENT
Start: 2024-11-06

## 2024-11-06 ASSESSMENT — ANXIETY QUESTIONNAIRES
5. BEING SO RESTLESS THAT IT IS HARD TO SIT STILL: SEVERAL DAYS
4. TROUBLE RELAXING: SEVERAL DAYS
GAD7 TOTAL SCORE: 10
1. FEELING NERVOUS, ANXIOUS, OR ON EDGE: MORE THAN HALF THE DAYS
IF YOU CHECKED OFF ANY PROBLEMS ON THIS QUESTIONNAIRE, HOW DIFFICULT HAVE THESE PROBLEMS MADE IT FOR YOU TO DO YOUR WORK, TAKE CARE OF THINGS AT HOME, OR GET ALONG WITH OTHER PEOPLE: SOMEWHAT DIFFICULT
6. BECOMING EASILY ANNOYED OR IRRITABLE: MORE THAN HALF THE DAYS
3. WORRYING TOO MUCH ABOUT DIFFERENT THINGS: MORE THAN HALF THE DAYS
2. NOT BEING ABLE TO STOP OR CONTROL WORRYING: SEVERAL DAYS
7. FEELING AFRAID AS IF SOMETHING AWFUL MIGHT HAPPEN: SEVERAL DAYS

## 2024-11-06 ASSESSMENT — PATIENT HEALTH QUESTIONNAIRE - PHQ9
SUM OF ALL RESPONSES TO PHQ QUESTIONS 1-9: 11
1. LITTLE INTEREST OR PLEASURE IN DOING THINGS: SEVERAL DAYS
9. THOUGHTS THAT YOU WOULD BE BETTER OFF DEAD, OR OF HURTING YOURSELF: NOT AT ALL
4. FEELING TIRED OR HAVING LITTLE ENERGY: MORE THAN HALF THE DAYS
2. FEELING DOWN, DEPRESSED OR HOPELESS: MORE THAN HALF THE DAYS
5. POOR APPETITE OR OVEREATING: SEVERAL DAYS
SUM OF ALL RESPONSES TO PHQ9 QUESTIONS 1 & 2: 3
SUM OF ALL RESPONSES TO PHQ QUESTIONS 1-9: 11
3. TROUBLE FALLING OR STAYING ASLEEP: SEVERAL DAYS
6. FEELING BAD ABOUT YOURSELF - OR THAT YOU ARE A FAILURE OR HAVE LET YOURSELF OR YOUR FAMILY DOWN: MORE THAN HALF THE DAYS
10. IF YOU CHECKED OFF ANY PROBLEMS, HOW DIFFICULT HAVE THESE PROBLEMS MADE IT FOR YOU TO DO YOUR WORK, TAKE CARE OF THINGS AT HOME, OR GET ALONG WITH OTHER PEOPLE: SOMEWHAT DIFFICULT
SUM OF ALL RESPONSES TO PHQ QUESTIONS 1-9: 11
8. MOVING OR SPEAKING SO SLOWLY THAT OTHER PEOPLE COULD HAVE NOTICED. OR THE OPPOSITE, BEING SO FIGETY OR RESTLESS THAT YOU HAVE BEEN MOVING AROUND A LOT MORE THAN USUAL: SEVERAL DAYS
SUM OF ALL RESPONSES TO PHQ QUESTIONS 1-9: 11
7. TROUBLE CONCENTRATING ON THINGS, SUCH AS READING THE NEWSPAPER OR WATCHING TELEVISION: SEVERAL DAYS

## 2024-11-06 NOTE — PROGRESS NOTES
Chief Complaint   Patient presents with    Medication Check      Vitals:    11/06/24 1441   BP: 110/70   Pulse: 76   Resp: 16   Temp: 98.3 °F (36.8 °C)   SpO2: 100%      Prior to Admission medications    Medication Sig Start Date End Date Taking? Authorizing Provider   escitalopram (LEXAPRO) 5 MG tablet Take 1 tablet by mouth daily 9/4/24  Yes Dayanna Smith APRN - NP   Prenatal Vit-Fe Fumarate-FA (PRENATAL VITAMIN PO) Take by mouth daily   Yes Provider, MD Sandy   albuterol sulfate HFA (PROAIR HFA) 108 (90 Base) MCG/ACT inhaler INHALE 2 PUFFS PO Q 4 TO 6 H PRN BREATHING 8/31/17  Yes Automatic Reconciliation, Ar      PHQ-9 Total Score: 11 (11/6/2024  2:39 PM)  Thoughts that you would be better off dead, or of hurting yourself in some way: 0 (11/6/2024  2:39 PM)         11/6/2024     2:39 PM 9/4/2024     1:57 PM 7/24/2024     1:37 PM   PHQ-9    Little interest or pleasure in doing things 1 2 2   Feeling down, depressed, or hopeless 2 2 2   Trouble falling or staying asleep, or sleeping too much 1 1 2   Feeling tired or having little energy 2 3 3   Poor appetite or overeating 1 1 1   Feeling bad about yourself - or that you are a failure or have let yourself or your family down 2 1 1   Trouble concentrating on things, such as reading the newspaper or watching television 1 2 2   Moving or speaking so slowly that other people could have noticed. Or the opposite - being so fidgety or restless that you have been moving around a lot more than usual 1 1 1   Thoughts that you would be better off dead, or of hurting yourself in some way 0 0 0   PHQ-2 Score 3 4 4   PHQ-9 Total Score 11 13 14   If you checked off any problems, how difficult have these problems made it for you to do your work, take care of things at home, or get along with other people? 1 2 2         11/6/2024     2:40 PM 9/4/2024     1:59 PM 7/24/2024     1:38 PM   OTONIEL-7 SCREENING   Feeling nervous, anxious, or on edge More than half the days More 
plan.    Follow-up and Dispositions    Return in about 6 weeks (around 12/18/2024).       11/6/2024  DELANO Jamison NP

## 2024-11-07 ENCOUNTER — TELEPHONE (OUTPATIENT)
Age: 29
End: 2024-11-07

## 2024-11-07 NOTE — TELEPHONE ENCOUNTER
Coty Pan from [facility] calling and inquiring about this pts medical records from her 2018 pregnancy (C-sec) and this is for continual care for her current pregnancy.ccm

## 2024-12-18 ENCOUNTER — OFFICE VISIT (OUTPATIENT)
Age: 29
End: 2024-12-18
Payer: MEDICAID

## 2024-12-18 VITALS
SYSTOLIC BLOOD PRESSURE: 108 MMHG | HEART RATE: 80 BPM | OXYGEN SATURATION: 98 % | RESPIRATION RATE: 16 BRPM | BODY MASS INDEX: 28.43 KG/M2 | WEIGHT: 141 LBS | TEMPERATURE: 97.9 F | HEIGHT: 59 IN | DIASTOLIC BLOOD PRESSURE: 77 MMHG

## 2024-12-18 DIAGNOSIS — F41.9 ANXIETY: ICD-10-CM

## 2024-12-18 DIAGNOSIS — F33.1 DEPRESSION, MAJOR, RECURRENT, MODERATE (HCC): Primary | ICD-10-CM

## 2024-12-18 PROCEDURE — 99214 OFFICE O/P EST MOD 30 MIN: CPT | Performed by: NURSE PRACTITIONER

## 2024-12-18 ASSESSMENT — ANXIETY QUESTIONNAIRES
6. BECOMING EASILY ANNOYED OR IRRITABLE: NEARLY EVERY DAY
4. TROUBLE RELAXING: SEVERAL DAYS
1. FEELING NERVOUS, ANXIOUS, OR ON EDGE: MORE THAN HALF THE DAYS
2. NOT BEING ABLE TO STOP OR CONTROL WORRYING: MORE THAN HALF THE DAYS
3. WORRYING TOO MUCH ABOUT DIFFERENT THINGS: NEARLY EVERY DAY
5. BEING SO RESTLESS THAT IT IS HARD TO SIT STILL: SEVERAL DAYS
IF YOU CHECKED OFF ANY PROBLEMS ON THIS QUESTIONNAIRE, HOW DIFFICULT HAVE THESE PROBLEMS MADE IT FOR YOU TO DO YOUR WORK, TAKE CARE OF THINGS AT HOME, OR GET ALONG WITH OTHER PEOPLE: SOMEWHAT DIFFICULT
GAD7 TOTAL SCORE: 13
7. FEELING AFRAID AS IF SOMETHING AWFUL MIGHT HAPPEN: SEVERAL DAYS

## 2024-12-18 ASSESSMENT — PATIENT HEALTH QUESTIONNAIRE - PHQ9
SUM OF ALL RESPONSES TO PHQ QUESTIONS 1-9: 15
9. THOUGHTS THAT YOU WOULD BE BETTER OFF DEAD, OR OF HURTING YOURSELF: NOT AT ALL
2. FEELING DOWN, DEPRESSED OR HOPELESS: NEARLY EVERY DAY
10. IF YOU CHECKED OFF ANY PROBLEMS, HOW DIFFICULT HAVE THESE PROBLEMS MADE IT FOR YOU TO DO YOUR WORK, TAKE CARE OF THINGS AT HOME, OR GET ALONG WITH OTHER PEOPLE: SOMEWHAT DIFFICULT
SUM OF ALL RESPONSES TO PHQ QUESTIONS 1-9: 15
1. LITTLE INTEREST OR PLEASURE IN DOING THINGS: MORE THAN HALF THE DAYS
6. FEELING BAD ABOUT YOURSELF - OR THAT YOU ARE A FAILURE OR HAVE LET YOURSELF OR YOUR FAMILY DOWN: NEARLY EVERY DAY
5. POOR APPETITE OR OVEREATING: SEVERAL DAYS
7. TROUBLE CONCENTRATING ON THINGS, SUCH AS READING THE NEWSPAPER OR WATCHING TELEVISION: SEVERAL DAYS
8. MOVING OR SPEAKING SO SLOWLY THAT OTHER PEOPLE COULD HAVE NOTICED. OR THE OPPOSITE, BEING SO FIGETY OR RESTLESS THAT YOU HAVE BEEN MOVING AROUND A LOT MORE THAN USUAL: SEVERAL DAYS
3. TROUBLE FALLING OR STAYING ASLEEP: MORE THAN HALF THE DAYS
SUM OF ALL RESPONSES TO PHQ9 QUESTIONS 1 & 2: 5
SUM OF ALL RESPONSES TO PHQ QUESTIONS 1-9: 15
SUM OF ALL RESPONSES TO PHQ QUESTIONS 1-9: 15
4. FEELING TIRED OR HAVING LITTLE ENERGY: MORE THAN HALF THE DAYS

## 2024-12-18 NOTE — PROGRESS NOTES
Chief Complaint   Patient presents with    Medication Check      Vitals:    12/18/24 1434   BP: 108/77   Pulse: 80   Resp: 16   Temp: 97.9 °F (36.6 °C)   SpO2: 98%      Prior to Admission medications    Medication Sig Start Date End Date Taking? Authorizing Provider   escitalopram (LEXAPRO) 5 MG tablet Take 1 tablet by mouth daily 11/6/24  Yes Dayanna Smith APRN - NP   Prenatal Vit-Fe Fumarate-FA (PRENATAL VITAMIN PO) Take by mouth daily   Yes Provider, MD Sandy   albuterol sulfate HFA (PROAIR HFA) 108 (90 Base) MCG/ACT inhaler INHALE 2 PUFFS PO Q 4 TO 6 H PRN BREATHING 8/31/17  Yes Automatic Reconciliation, Ar      PHQ-9 Total Score: 15 (12/18/2024  2:32 PM)  Thoughts that you would be better off dead, or of hurting yourself in some way: 0 (12/18/2024  2:32 PM)         12/18/2024     2:32 PM 11/6/2024     2:39 PM 9/4/2024     1:57 PM   PHQ-9    Little interest or pleasure in doing things 2 1 2   Feeling down, depressed, or hopeless 3 2 2   Trouble falling or staying asleep, or sleeping too much 2 1 1   Feeling tired or having little energy 2 2 3   Poor appetite or overeating 1 1 1   Feeling bad about yourself - or that you are a failure or have let yourself or your family down 3 2 1   Trouble concentrating on things, such as reading the newspaper or watching television 1 1 2   Moving or speaking so slowly that other people could have noticed. Or the opposite - being so fidgety or restless that you have been moving around a lot more than usual 1 1 1   Thoughts that you would be better off dead, or of hurting yourself in some way 0 0 0   PHQ-2 Score 5 3 4   PHQ-9 Total Score 15 11 13   If you checked off any problems, how difficult have these problems made it for you to do your work, take care of things at home, or get along with other people? 1 1 2           12/18/2024     2:33 PM 11/6/2024     2:40 PM 9/4/2024     1:59 PM   OTONIEL-7 SCREENING   Feeling nervous, anxious, or on edge More than half the

## 2024-12-18 NOTE — PROGRESS NOTES
CHIEF COMPLAINT:  Adalgisa Monzon is a 29 y.o. female and was seen today for follow-up of psychiatric condition and psychotropic medication management.     HPI:    Adalgisa reports the following psychiatric symptoms by hx:  depression, anxiety, and inattentiveness.  Overall symptoms have been present for years. Currently symptoms are of moderate severity. The symptoms occur intermittently. Patient identifies some stress with pregnancy and finances. She states that she has been looking into remote jobs. The baby is due on 1/6/2024. She admits to being tired and irritable. Her depression and anxiety is about the same. Fatigue remains. She reports compliance with medications. Denies any side effects. Patient appetite and sleep fluctuates. Patient denies symptoms of psychosis or flora. Denies suicidal or homicidal ideation.     Current Outpatient Medications on File Prior to Visit   Medication Sig Dispense Refill    escitalopram (LEXAPRO) 5 MG tablet Take 1 tablet by mouth daily 90 tablet 0    Prenatal Vit-Fe Fumarate-FA (PRENATAL VITAMIN PO) Take by mouth daily      albuterol sulfate HFA (PROAIR HFA) 108 (90 Base) MCG/ACT inhaler INHALE 2 PUFFS PO Q 4 TO 6 H PRN BREATHING       No current facility-administered medications on file prior to visit.        Past Medical History:   Diagnosis Date    Asthma         Family History   Problem Relation Age of Onset    Heart Failure Mother        Social History     Socioeconomic History    Marital status:      Spouse name: Not on file    Number of children: Not on file    Years of education: Not on file    Highest education level: Not on file   Occupational History    Not on file   Tobacco Use    Smoking status: Some Days    Smokeless tobacco: Never   Substance and Sexual Activity    Alcohol use: Yes    Drug use: No    Sexual activity: Not on file   Other Topics Concern    Not on file   Social History Narrative    Not on file     Social Determinants of Health

## 2025-03-19 ENCOUNTER — OFFICE VISIT (OUTPATIENT)
Age: 30
End: 2025-03-19
Payer: MEDICAID

## 2025-03-19 VITALS
RESPIRATION RATE: 16 BRPM | DIASTOLIC BLOOD PRESSURE: 65 MMHG | OXYGEN SATURATION: 100 % | HEIGHT: 59 IN | BODY MASS INDEX: 26 KG/M2 | WEIGHT: 129 LBS | HEART RATE: 71 BPM | SYSTOLIC BLOOD PRESSURE: 106 MMHG | TEMPERATURE: 97.9 F

## 2025-03-19 DIAGNOSIS — F90.2 ATTENTION-DEFICIT HYPERACTIVITY DISORDER, COMBINED TYPE: ICD-10-CM

## 2025-03-19 DIAGNOSIS — F33.1 DEPRESSION, MAJOR, RECURRENT, MODERATE (HCC): ICD-10-CM

## 2025-03-19 DIAGNOSIS — F41.9 ANXIETY: Primary | Chronic | ICD-10-CM

## 2025-03-19 PROCEDURE — 99214 OFFICE O/P EST MOD 30 MIN: CPT | Performed by: NURSE PRACTITIONER

## 2025-03-19 RX ORDER — DOCUSATE SODIUM 100 MG/1
CAPSULE, LIQUID FILLED ORAL
COMMUNITY
Start: 2025-01-06

## 2025-03-19 RX ORDER — IBUPROFEN 600 MG/1
TABLET, FILM COATED ORAL
COMMUNITY
Start: 2025-01-06

## 2025-03-19 RX ORDER — FERROUS SULFATE 325(65) MG
325 TABLET ORAL EVERY OTHER DAY
COMMUNITY
Start: 2025-01-06 | End: 2026-01-06

## 2025-03-19 RX ORDER — PSEUDOEPHED/ACETAMINOPH/DIPHEN 30MG-500MG
TABLET ORAL
COMMUNITY
Start: 2025-01-06

## 2025-03-19 ASSESSMENT — ANXIETY QUESTIONNAIRES
3. WORRYING TOO MUCH ABOUT DIFFERENT THINGS: MORE THAN HALF THE DAYS
IF YOU CHECKED OFF ANY PROBLEMS ON THIS QUESTIONNAIRE, HOW DIFFICULT HAVE THESE PROBLEMS MADE IT FOR YOU TO DO YOUR WORK, TAKE CARE OF THINGS AT HOME, OR GET ALONG WITH OTHER PEOPLE: SOMEWHAT DIFFICULT
1. FEELING NERVOUS, ANXIOUS, OR ON EDGE: MORE THAN HALF THE DAYS
5. BEING SO RESTLESS THAT IT IS HARD TO SIT STILL: SEVERAL DAYS
GAD7 TOTAL SCORE: 9
2. NOT BEING ABLE TO STOP OR CONTROL WORRYING: MORE THAN HALF THE DAYS
7. FEELING AFRAID AS IF SOMETHING AWFUL MIGHT HAPPEN: SEVERAL DAYS
6. BECOMING EASILY ANNOYED OR IRRITABLE: NOT AT ALL
4. TROUBLE RELAXING: SEVERAL DAYS

## 2025-03-19 ASSESSMENT — PATIENT HEALTH QUESTIONNAIRE - PHQ9
SUM OF ALL RESPONSES TO PHQ QUESTIONS 1-9: 6
SUM OF ALL RESPONSES TO PHQ QUESTIONS 1-9: 6
6. FEELING BAD ABOUT YOURSELF - OR THAT YOU ARE A FAILURE OR HAVE LET YOURSELF OR YOUR FAMILY DOWN: SEVERAL DAYS
3. TROUBLE FALLING OR STAYING ASLEEP: NOT AT ALL
SUM OF ALL RESPONSES TO PHQ QUESTIONS 1-9: 6
5. POOR APPETITE OR OVEREATING: NOT AT ALL
8. MOVING OR SPEAKING SO SLOWLY THAT OTHER PEOPLE COULD HAVE NOTICED. OR THE OPPOSITE, BEING SO FIGETY OR RESTLESS THAT YOU HAVE BEEN MOVING AROUND A LOT MORE THAN USUAL: NOT AT ALL
2. FEELING DOWN, DEPRESSED OR HOPELESS: SEVERAL DAYS
7. TROUBLE CONCENTRATING ON THINGS, SUCH AS READING THE NEWSPAPER OR WATCHING TELEVISION: MORE THAN HALF THE DAYS
SUM OF ALL RESPONSES TO PHQ QUESTIONS 1-9: 6
1. LITTLE INTEREST OR PLEASURE IN DOING THINGS: NOT AT ALL
4. FEELING TIRED OR HAVING LITTLE ENERGY: MORE THAN HALF THE DAYS
10. IF YOU CHECKED OFF ANY PROBLEMS, HOW DIFFICULT HAVE THESE PROBLEMS MADE IT FOR YOU TO DO YOUR WORK, TAKE CARE OF THINGS AT HOME, OR GET ALONG WITH OTHER PEOPLE: NOT DIFFICULT AT ALL
9. THOUGHTS THAT YOU WOULD BE BETTER OFF DEAD, OR OF HURTING YOURSELF: NOT AT ALL

## 2025-03-19 NOTE — PROGRESS NOTES
Chief Complaint   Patient presents with    Medication Check      Vitals:    03/19/25 1342   BP: 106/65   Pulse: 71   Resp: 16   Temp: 97.9 °F (36.6 °C)   SpO2: 100%      Prior to Admission medications    Medication Sig Start Date End Date Taking? Authorizing Provider   ferrous sulfate (IRON 325) 325 (65 Fe) MG tablet Take 1 tablet by mouth every other day 1/6/25 1/6/26 Yes Sandy Ramsay MD   Acetaminophen Extra Strength 500 MG TABS TAKE 2 TABLETS BY MOUTH EVERY 6 HOURS AS NEEDED FOR MILD PAIN FOR UP TO 10 DAYS. 1/6/25  Yes Sandy Ramsay MD   ibuprofen (ADVIL;MOTRIN) 600 MG tablet TAKE 1 TABLET BY MOUTH EVERY 6 HOURS FOR 10 DAYS. 1/6/25  Yes Sandy Ramsay MD   docusate sodium (COLACE) 100 MG capsule TAKE 1 CAPSULE BY MOUTH TWICE A DAY FOR 10 DAYS 1/6/25  Yes Sandy Ramsay MD   escitalopram (LEXAPRO) 5 MG tablet Take 1 tablet by mouth daily 11/6/24  Yes Dayanna Smith APRN - NP   Prenatal Vit-Fe Fumarate-FA (PRENATAL VITAMIN PO) Take by mouth daily   Yes Sandy Ramsay MD   albuterol sulfate HFA (PROAIR HFA) 108 (90 Base) MCG/ACT inhaler INHALE 2 PUFFS PO Q 4 TO 6 H PRN BREATHING 8/31/17  Yes Automatic Reconciliation, Ar      PHQ-9 Total Score: 6 (3/19/2025  1:43 PM)  Thoughts that you would be better off dead, or of hurting yourself in some way: 0 (3/19/2025  1:43 PM)         3/19/2025     1:43 PM 12/18/2024     2:32 PM 11/6/2024     2:39 PM   PHQ-9    Little interest or pleasure in doing things 0 2 1   Feeling down, depressed, or hopeless 1 3 2   Trouble falling or staying asleep, or sleeping too much 0 2 1   Feeling tired or having little energy 2 2 2   Poor appetite or overeating 0 1 1   Feeling bad about yourself - or that you are a failure or have let yourself or your family down 1 3 2   Trouble concentrating on things, such as reading the newspaper or watching television 2 1 1   Moving or speaking so slowly that other people could have noticed. Or the opposite - 
the acknowledged risks. Reviewed treatment goals and target symptoms to monitor for.     Plan:  1. MEDICATION:        medication changes made today:     Current Outpatient Medications:     ferrous sulfate (IRON 325) 325 (65 Fe) MG tablet, Take 1 tablet by mouth every other day, Disp: , Rfl:     Acetaminophen Extra Strength 500 MG TABS, TAKE 2 TABLETS BY MOUTH EVERY 6 HOURS AS NEEDED FOR MILD PAIN FOR UP TO 10 DAYS., Disp: , Rfl:     ibuprofen (ADVIL;MOTRIN) 600 MG tablet, TAKE 1 TABLET BY MOUTH EVERY 6 HOURS FOR 10 DAYS., Disp: , Rfl:     docusate sodium (COLACE) 100 MG capsule, TAKE 1 CAPSULE BY MOUTH TWICE A DAY FOR 10 DAYS, Disp: , Rfl:     escitalopram (LEXAPRO) 5 MG tablet, Take 1 tablet by mouth daily, Disp: 90 tablet, Rfl: 0    Prenatal Vit-Fe Fumarate-FA (PRENATAL VITAMIN PO), Take by mouth daily, Disp: , Rfl:     albuterol sulfate HFA (PROAIR HFA) 108 (90 Base) MCG/ACT inhaler, INHALE 2 PUFFS PO Q 4 TO 6 H PRN BREATHING, Disp: , Rfl:       2.  Counseling and coordination of care including instructions for treatment, risks/benefits, risk factor reduction and patient/family education. She agrees with the plan. Patient instructed to call with any side effects, questions or issues.   3.PSYCHOTHERAPY: Patient was provided with supportive therapy, strongly encourage to seek psychotherapy.    4. MEDICAL CARE: Patient was strongly encourage to take their medical medications and follow up with their PCP on regular basis.    5. SUBSTANCE ABUSE CARE: Patient denies a smoking, drinking, abusing any illicit drugs.  6. Patient was informed that in case of emergency to go to the nearest ER or call 911.     Patient was given time to ask questions and voice concerns. I believe all questions concerns were adequately addressed at this office visit. Patient verbalized agreement and understanding of the above-stated plan.    Follow-up and Dispositions    Return in about 2 months (around 5/19/2025).       3/20/2025  Dayanna

## 2025-05-14 ENCOUNTER — OFFICE VISIT (OUTPATIENT)
Age: 30
End: 2025-05-14
Payer: MEDICAID

## 2025-05-14 VITALS — OXYGEN SATURATION: 99 % | HEIGHT: 59 IN | BODY MASS INDEX: 25.8 KG/M2 | RESPIRATION RATE: 15 BRPM | WEIGHT: 128 LBS

## 2025-05-14 DIAGNOSIS — F41.9 ANXIETY: ICD-10-CM

## 2025-05-14 DIAGNOSIS — F33.1 DEPRESSION, MAJOR, RECURRENT, MODERATE (HCC): ICD-10-CM

## 2025-05-14 PROCEDURE — 99214 OFFICE O/P EST MOD 30 MIN: CPT | Performed by: NURSE PRACTITIONER

## 2025-05-14 RX ORDER — ESCITALOPRAM OXALATE 5 MG/1
5 TABLET ORAL DAILY
Qty: 90 TABLET | Refills: 0 | Status: SHIPPED | OUTPATIENT
Start: 2025-05-14

## 2025-05-14 ASSESSMENT — PATIENT HEALTH QUESTIONNAIRE - PHQ9
1. LITTLE INTEREST OR PLEASURE IN DOING THINGS: SEVERAL DAYS
SUM OF ALL RESPONSES TO PHQ QUESTIONS 1-9: 11
2. FEELING DOWN, DEPRESSED OR HOPELESS: MORE THAN HALF THE DAYS
3. TROUBLE FALLING OR STAYING ASLEEP: SEVERAL DAYS
6. FEELING BAD ABOUT YOURSELF - OR THAT YOU ARE A FAILURE OR HAVE LET YOURSELF OR YOUR FAMILY DOWN: MORE THAN HALF THE DAYS
10. IF YOU CHECKED OFF ANY PROBLEMS, HOW DIFFICULT HAVE THESE PROBLEMS MADE IT FOR YOU TO DO YOUR WORK, TAKE CARE OF THINGS AT HOME, OR GET ALONG WITH OTHER PEOPLE: SOMEWHAT DIFFICULT
5. POOR APPETITE OR OVEREATING: SEVERAL DAYS
SUM OF ALL RESPONSES TO PHQ QUESTIONS 1-9: 11
SUM OF ALL RESPONSES TO PHQ QUESTIONS 1-9: 11
8. MOVING OR SPEAKING SO SLOWLY THAT OTHER PEOPLE COULD HAVE NOTICED. OR THE OPPOSITE, BEING SO FIGETY OR RESTLESS THAT YOU HAVE BEEN MOVING AROUND A LOT MORE THAN USUAL: SEVERAL DAYS
7. TROUBLE CONCENTRATING ON THINGS, SUCH AS READING THE NEWSPAPER OR WATCHING TELEVISION: SEVERAL DAYS
SUM OF ALL RESPONSES TO PHQ QUESTIONS 1-9: 11
9. THOUGHTS THAT YOU WOULD BE BETTER OFF DEAD, OR OF HURTING YOURSELF: NOT AT ALL
4. FEELING TIRED OR HAVING LITTLE ENERGY: MORE THAN HALF THE DAYS

## 2025-05-14 NOTE — PROGRESS NOTES
Chief Complaint   Patient presents with    Medication Check      Vitals:    05/14/25 1257   Resp: 15   SpO2: 99%      Prior to Admission medications    Medication Sig Start Date End Date Taking? Authorizing Provider   ferrous sulfate (IRON 325) 325 (65 Fe) MG tablet Take 1 tablet by mouth every other day 1/6/25 1/6/26 Yes Sandy Ramsay MD   Acetaminophen Extra Strength 500 MG TABS TAKE 2 TABLETS BY MOUTH EVERY 6 HOURS AS NEEDED FOR MILD PAIN FOR UP TO 10 DAYS. 1/6/25  Yes Sandy Ramsay MD   ibuprofen (ADVIL;MOTRIN) 600 MG tablet TAKE 1 TABLET BY MOUTH EVERY 6 HOURS FOR 10 DAYS. 1/6/25  Yes Sandy Ramsay MD   docusate sodium (COLACE) 100 MG capsule TAKE 1 CAPSULE BY MOUTH TWICE A DAY FOR 10 DAYS 1/6/25  Yes Sandy Ramsay MD   escitalopram (LEXAPRO) 5 MG tablet Take 1 tablet by mouth daily 11/6/24  Yes Dayanna Smith APRN - NP   Prenatal Vit-Fe Fumarate-FA (PRENATAL VITAMIN PO) Take by mouth daily   Yes Sandy Ramsay MD   albuterol sulfate HFA (PROAIR HFA) 108 (90 Base) MCG/ACT inhaler INHALE 2 PUFFS PO Q 4 TO 6 H PRN BREATHING 8/31/17  Yes Automatic Reconciliation, Ar            5/14/2025    12:58 PM 3/19/2025     1:43 PM 12/18/2024     2:32 PM   PHQ-9    Little interest or pleasure in doing things 1 0 2   Feeling down, depressed, or hopeless 2 1 3   Trouble falling or staying asleep, or sleeping too much 1 0 2   Feeling tired or having little energy 2 2 2   Poor appetite or overeating 1 0 1   Feeling bad about yourself - or that you are a failure or have let yourself or your family down 2 1 3   Trouble concentrating on things, such as reading the newspaper or watching television 1 2 1   Moving or speaking so slowly that other people could have noticed. Or the opposite - being so fidgety or restless that you have been moving around a lot more than usual 1 0 1   Thoughts that you would be better off dead, or of hurting yourself in some way 0 0 0   PHQ-2 Score 3 1 5

## 2025-05-14 NOTE — PROGRESS NOTES
CHIEF COMPLAINT:  Adalgisa Monzon is a 29 y.o. female and was seen today for follow-up of psychiatric condition and psychotropic medication management.     HPI:    Adalgisa reports the following psychiatric symptoms by hx:  depression, anxiety, attentiveness.  Overall symptoms have been present for years. Currently symptoms are of moderate severity. The symptoms increased some over the last two weeks. Patient admits to feeling overwhelmed and anticipating things to worsen with her daughter getting out of school soon for the summer. She feels her depression and anxiety are mildly worse. She rates her depression at 5/10 with 10 being the worst. Anxiety is rated at 6.5/10 with 10 being the worst. Energy level is low. Concentrate is impaired. She reports compliance with medications. Denies side effects. Patient Appetite: \"okay, I could probably be eating more.\" Sleep: no change. Patient denies symptoms of psychosis or flora. Denies suicidal or homicidal ideation.      Current Outpatient Medications on File Prior to Visit   Medication Sig Dispense Refill    ferrous sulfate (IRON 325) 325 (65 Fe) MG tablet Take 1 tablet by mouth every other day      Acetaminophen Extra Strength 500 MG TABS TAKE 2 TABLETS BY MOUTH EVERY 6 HOURS AS NEEDED FOR MILD PAIN FOR UP TO 10 DAYS.      ibuprofen (ADVIL;MOTRIN) 600 MG tablet TAKE 1 TABLET BY MOUTH EVERY 6 HOURS FOR 10 DAYS.      docusate sodium (COLACE) 100 MG capsule TAKE 1 CAPSULE BY MOUTH TWICE A DAY FOR 10 DAYS      Prenatal Vit-Fe Fumarate-FA (PRENATAL VITAMIN PO) Take by mouth daily      albuterol sulfate HFA (PROAIR HFA) 108 (90 Base) MCG/ACT inhaler INHALE 2 PUFFS PO Q 4 TO 6 H PRN BREATHING       No current facility-administered medications on file prior to visit.      Past Medical History:   Diagnosis Date    Asthma         Family History   Problem Relation Age of Onset    Heart Failure Mother      Social History     Socioeconomic History    Marital status:

## 2025-07-08 ENCOUNTER — TELEPHONE (OUTPATIENT)
Age: 30
End: 2025-07-08

## 2025-07-24 ENCOUNTER — TELEMEDICINE (OUTPATIENT)
Age: 30
End: 2025-07-24
Payer: MEDICAID

## 2025-07-24 DIAGNOSIS — F33.1 DEPRESSION, MAJOR, RECURRENT, MODERATE (HCC): Primary | ICD-10-CM

## 2025-07-24 DIAGNOSIS — F41.9 ANXIETY: ICD-10-CM

## 2025-07-24 PROCEDURE — 99214 OFFICE O/P EST MOD 30 MIN: CPT | Performed by: NURSE PRACTITIONER

## 2025-07-24 RX ORDER — ESCITALOPRAM OXALATE 10 MG/1
10 TABLET ORAL DAILY
Qty: 90 TABLET | Refills: 0 | Status: SHIPPED | OUTPATIENT
Start: 2025-07-24

## 2025-07-24 ASSESSMENT — PATIENT HEALTH QUESTIONNAIRE - PHQ9
9. THOUGHTS THAT YOU WOULD BE BETTER OFF DEAD, OR OF HURTING YOURSELF: NOT AT ALL
5. POOR APPETITE OR OVEREATING: SEVERAL DAYS
10. IF YOU CHECKED OFF ANY PROBLEMS, HOW DIFFICULT HAVE THESE PROBLEMS MADE IT FOR YOU TO DO YOUR WORK, TAKE CARE OF THINGS AT HOME, OR GET ALONG WITH OTHER PEOPLE: SOMEWHAT DIFFICULT
9. THOUGHTS THAT YOU WOULD BE BETTER OFF DEAD, OR OF HURTING YOURSELF: NOT AT ALL
SUM OF ALL RESPONSES TO PHQ QUESTIONS 1-9: 8
SUM OF ALL RESPONSES TO PHQ QUESTIONS 1-9: 8
4. FEELING TIRED OR HAVING LITTLE ENERGY: SEVERAL DAYS
2. FEELING DOWN, DEPRESSED OR HOPELESS: SEVERAL DAYS
8. MOVING OR SPEAKING SO SLOWLY THAT OTHER PEOPLE COULD HAVE NOTICED. OR THE OPPOSITE - BEING SO FIDGETY OR RESTLESS THAT YOU HAVE BEEN MOVING AROUND A LOT MORE THAN USUAL: NOT AT ALL
1. LITTLE INTEREST OR PLEASURE IN DOING THINGS: SEVERAL DAYS
2. FEELING DOWN, DEPRESSED OR HOPELESS: SEVERAL DAYS
SUM OF ALL RESPONSES TO PHQ QUESTIONS 1-9: 8
4. FEELING TIRED OR HAVING LITTLE ENERGY: SEVERAL DAYS
8. MOVING OR SPEAKING SO SLOWLY THAT OTHER PEOPLE COULD HAVE NOTICED. OR THE OPPOSITE, BEING SO FIGETY OR RESTLESS THAT YOU HAVE BEEN MOVING AROUND A LOT MORE THAN USUAL: NOT AT ALL
7. TROUBLE CONCENTRATING ON THINGS, SUCH AS READING THE NEWSPAPER OR WATCHING TELEVISION: SEVERAL DAYS
3. TROUBLE FALLING OR STAYING ASLEEP: SEVERAL DAYS
1. LITTLE INTEREST OR PLEASURE IN DOING THINGS: SEVERAL DAYS
3. TROUBLE FALLING OR STAYING ASLEEP: SEVERAL DAYS
6. FEELING BAD ABOUT YOURSELF - OR THAT YOU ARE A FAILURE OR HAVE LET YOURSELF OR YOUR FAMILY DOWN: MORE THAN HALF THE DAYS
SUM OF ALL RESPONSES TO PHQ9 QUESTIONS 1 & 2: 2
7. TROUBLE CONCENTRATING ON THINGS, SUCH AS READING THE NEWSPAPER OR WATCHING TELEVISION: SEVERAL DAYS
6. FEELING BAD ABOUT YOURSELF - OR THAT YOU ARE A FAILURE OR HAVE LET YOURSELF OR YOUR FAMILY DOWN: MORE THAN HALF THE DAYS
SUM OF ALL RESPONSES TO PHQ QUESTIONS 1-9: 8
SUM OF ALL RESPONSES TO PHQ QUESTIONS 1-9: 8
10. IF YOU CHECKED OFF ANY PROBLEMS, HOW DIFFICULT HAVE THESE PROBLEMS MADE IT FOR YOU TO DO YOUR WORK, TAKE CARE OF THINGS AT HOME, OR GET ALONG WITH OTHER PEOPLE: SOMEWHAT DIFFICULT
5. POOR APPETITE OR OVEREATING: SEVERAL DAYS

## 2025-07-24 ASSESSMENT — ANXIETY QUESTIONNAIRES
IF YOU CHECKED OFF ANY PROBLEMS ON THIS QUESTIONNAIRE, HOW DIFFICULT HAVE THESE PROBLEMS MADE IT FOR YOU TO DO YOUR WORK, TAKE CARE OF THINGS AT HOME, OR GET ALONG WITH OTHER PEOPLE: SOMEWHAT DIFFICULT
6. BECOMING EASILY ANNOYED OR IRRITABLE: MORE THAN HALF THE DAYS
3. WORRYING TOO MUCH ABOUT DIFFERENT THINGS: MORE THAN HALF THE DAYS
2. NOT BEING ABLE TO STOP OR CONTROL WORRYING: NOT AT ALL
5. BEING SO RESTLESS THAT IT IS HARD TO SIT STILL: SEVERAL DAYS
7. FEELING AFRAID AS IF SOMETHING AWFUL MIGHT HAPPEN: SEVERAL DAYS
2. NOT BEING ABLE TO STOP OR CONTROL WORRYING: NOT AT ALL
1. FEELING NERVOUS, ANXIOUS, OR ON EDGE: SEVERAL DAYS
IF YOU CHECKED OFF ANY PROBLEMS ON THIS QUESTIONNAIRE, HOW DIFFICULT HAVE THESE PROBLEMS MADE IT FOR YOU TO DO YOUR WORK, TAKE CARE OF THINGS AT HOME, OR GET ALONG WITH OTHER PEOPLE: SOMEWHAT DIFFICULT
3. WORRYING TOO MUCH ABOUT DIFFERENT THINGS: MORE THAN HALF THE DAYS
GAD7 TOTAL SCORE: 8
4. TROUBLE RELAXING: SEVERAL DAYS
4. TROUBLE RELAXING: SEVERAL DAYS
5. BEING SO RESTLESS THAT IT IS HARD TO SIT STILL: SEVERAL DAYS
6. BECOMING EASILY ANNOYED OR IRRITABLE: MORE THAN HALF THE DAYS
7. FEELING AFRAID AS IF SOMETHING AWFUL MIGHT HAPPEN: SEVERAL DAYS
1. FEELING NERVOUS, ANXIOUS, OR ON EDGE: SEVERAL DAYS

## 2025-07-24 NOTE — PROGRESS NOTES
CHIEF COMPLAINT:  Adalgisa Monzon is a 29 y.o. female and was seen today for follow-up of psychiatric condition and psychotropic medication management.     HPI:    Adalgisa reports the following psychiatric symptoms by hx:  depression, anxiety, attentiveness.  Overall symptoms have been present for years. Currently symptoms are of low severity. The symptoms increased some with family being ill. She feels her depression and anxiety are mildly worse. She rates her depression at 3-4/10 with 10 being the worst. Anxiety is rated at 5-6/10 with 10 being the worst. Energy level is low. Concentrate is impaired. She reports compliance with medications. Denies side effects. Patient Appetite: \"okay,\" getting in enough nutrients for breast feeding. Sleep: no change. Patient denies symptoms of psychosis or flora. Denies suicidal or homicidal ideation.     Current Outpatient Medications on File Prior to Visit   Medication Sig Dispense Refill    ferrous sulfate (IRON 325) 325 (65 Fe) MG tablet Take 1 tablet by mouth every other day      Acetaminophen Extra Strength 500 MG TABS TAKE 2 TABLETS BY MOUTH EVERY 6 HOURS AS NEEDED FOR MILD PAIN FOR UP TO 10 DAYS.      ibuprofen (ADVIL;MOTRIN) 600 MG tablet TAKE 1 TABLET BY MOUTH EVERY 6 HOURS FOR 10 DAYS.      docusate sodium (COLACE) 100 MG capsule TAKE 1 CAPSULE BY MOUTH TWICE A DAY FOR 10 DAYS      Prenatal Vit-Fe Fumarate-FA (PRENATAL VITAMIN PO) Take by mouth daily      albuterol sulfate HFA (PROAIR HFA) 108 (90 Base) MCG/ACT inhaler INHALE 2 PUFFS PO Q 4 TO 6 H PRN BREATHING       No current facility-administered medications on file prior to visit.        Past Medical History:   Diagnosis Date    Asthma         Family History   Problem Relation Age of Onset    Heart Failure Mother      Social History     Socioeconomic History    Marital status:      Spouse name: Not on file    Number of children: Not on file    Years of education: Not on file    Highest education level:

## 2025-07-24 NOTE — PROGRESS NOTES
Chief Complaint   Patient presents with    Medication Check     Prior to Admission medications    Medication Sig Start Date End Date Taking? Authorizing Provider   escitalopram (LEXAPRO) 5 MG tablet Take 1 tablet by mouth daily 5/14/25   Dayanna Smith APRN - NP   ferrous sulfate (IRON 325) 325 (65 Fe) MG tablet Take 1 tablet by mouth every other day 1/6/25 1/6/26  Sandy Ramsay MD   Acetaminophen Extra Strength 500 MG TABS TAKE 2 TABLETS BY MOUTH EVERY 6 HOURS AS NEEDED FOR MILD PAIN FOR UP TO 10 DAYS. 1/6/25   Sandy Ramsay MD   ibuprofen (ADVIL;MOTRIN) 600 MG tablet TAKE 1 TABLET BY MOUTH EVERY 6 HOURS FOR 10 DAYS. 1/6/25   Sandy Ramsay MD   docusate sodium (COLACE) 100 MG capsule TAKE 1 CAPSULE BY MOUTH TWICE A DAY FOR 10 DAYS 1/6/25   Sandy Ramsay MD   Prenatal Vit-Fe Fumarate-FA (PRENATAL VITAMIN PO) Take by mouth daily    Sandy Ramsay MD   albuterol sulfate HFA (PROAIR HFA) 108 (90 Base) MCG/ACT inhaler INHALE 2 PUFFS PO Q 4 TO 6 H PRN BREATHING 8/31/17   Automatic Reconciliation, Ar          No data to display               PHQ-9 Total Score: (Patient-Rptd) 8 (7/24/2025 11:48 AM)  Thoughts that you would be better off dead, or of hurting yourself in some way: (Patient-Rptd) 0 (7/24/2025 11:48 AM)         7/24/2025    11:48 AM 5/14/2025    12:58 PM 3/19/2025     1:43 PM   PHQ-9    Little interest or pleasure in doing things 1 1 0   Feeling down, depressed, or hopeless 1 2 1   Trouble falling or staying asleep, or sleeping too much 1 1 0   Feeling tired or having little energy 1 2 2   Poor appetite or overeating 1 1 0   Feeling bad about yourself - or that you are a failure or have let yourself or your family down 2 2 1   Trouble concentrating on things, such as reading the newspaper or watching television 1 1 2   Moving or speaking so slowly that other people could have noticed. Or the opposite - being so fidgety or restless that you have been moving around a  Evaluation\" with an audiologist to discuss your lifestyle, features of hearing aid technology, and styles of hearing aids available.  It is recommended that you contact your insurance company to determine if you have a hearing aid benefit, as this may dictate who you can see for these services.  Have hearing tests as your doctor suggests. They can show whether your hearing has changed. Your hearing aid may need to be adjusted.  Use other assistive devices as needed. These may include:  Telephone amplifiers and hearing aids that can connect to a television, stereo, radio, or microphone.  Devices that use lights or vibrations. These alert you to the doorbell, a ringing telephone, or a baby monitor.  Television closed-captioning. This shows the words at the bottom of the screen. Most new TVs can do this.  TTY (text telephone). This lets you type messages back and forth on the telephone instead of talking or listening. These devices are also called TDD. When messages are typed on the keyboard, they are sent over the phone line to a receiving TTY. The message is shown on a monitor.  Use pagers, fax machines, text, and email if it is hard for you to communicate by telephone.  Try to learn a listening technique called speech-reading. It is not lip-reading. You pay attention to people's gestures, expressions, posture, and tone of voice. These clues can help you understand what a person is saying. Face the person you are talking to, and have him or her face you. Make sure the lighting is good. You need to see the other person's face clearly.  Think about counseling if you need help to adjust to your hearing loss.    When should you call for help?  Watch closely for changes in your health, and be sure to contact your doctor if:    You think your hearing is getting worse.     You have new symptoms, such as dizziness or nausea.